# Patient Record
Sex: MALE | Race: WHITE | NOT HISPANIC OR LATINO | Employment: FULL TIME | URBAN - NONMETROPOLITAN AREA
[De-identification: names, ages, dates, MRNs, and addresses within clinical notes are randomized per-mention and may not be internally consistent; named-entity substitution may affect disease eponyms.]

---

## 2023-04-26 ENCOUNTER — APPOINTMENT (EMERGENCY)
Dept: CT IMAGING | Facility: HOSPITAL | Age: 53
End: 2023-04-26

## 2023-04-26 ENCOUNTER — APPOINTMENT (EMERGENCY)
Dept: RADIOLOGY | Facility: HOSPITAL | Age: 53
End: 2023-04-26

## 2023-04-26 ENCOUNTER — HOSPITAL ENCOUNTER (INPATIENT)
Facility: HOSPITAL | Age: 53
LOS: 2 days | Discharge: HOME | End: 2023-04-28
Attending: EMERGENCY MEDICINE | Admitting: FAMILY MEDICINE

## 2023-04-26 DIAGNOSIS — I5A NONISCHEMIC NONTRAUMATIC MYOCARDIAL INJURY: ICD-10-CM

## 2023-04-26 DIAGNOSIS — I36.1 NONRHEUMATIC TRICUSPID VALVE REGURGITATION: ICD-10-CM

## 2023-04-26 DIAGNOSIS — I16.0 HYPERTENSIVE URGENCY: ICD-10-CM

## 2023-04-26 DIAGNOSIS — R77.8 ELEVATED TROPONIN: ICD-10-CM

## 2023-04-26 DIAGNOSIS — I16.1 HYPERTENSIVE EMERGENCY: Primary | ICD-10-CM

## 2023-04-26 DIAGNOSIS — R55 NEAR SYNCOPE: ICD-10-CM

## 2023-04-26 DIAGNOSIS — F19.10 SUBSTANCE ABUSE (HCC): ICD-10-CM

## 2023-04-26 LAB
2HR DELTA HS TROPONIN: -13 NG/L
4HR DELTA HS TROPONIN: -10 NG/L
ALBUMIN SERPL BCP-MCNC: 3.2 G/DL (ref 3.5–5)
ALP SERPL-CCNC: 62 U/L (ref 34–104)
ALT SERPL W P-5'-P-CCNC: 14 U/L (ref 7–52)
AMPHETAMINES SERPL QL SCN: POSITIVE
ANION GAP SERPL CALCULATED.3IONS-SCNC: 4 MMOL/L (ref 4–13)
APTT PPP: 27 SECONDS (ref 23–37)
AST SERPL W P-5'-P-CCNC: 9 U/L (ref 13–39)
BARBITURATES UR QL: NEGATIVE
BASOPHILS # BLD AUTO: 0.03 THOUSANDS/ΜL (ref 0–0.1)
BASOPHILS NFR BLD AUTO: 0 % (ref 0–1)
BENZODIAZ UR QL: NEGATIVE
BILIRUB SERPL-MCNC: 0.33 MG/DL (ref 0.2–1)
BILIRUB UR QL STRIP: NEGATIVE
BUN SERPL-MCNC: 41 MG/DL (ref 5–25)
CALCIUM ALBUM COR SERPL-MCNC: 8.6 MG/DL (ref 8.3–10.1)
CALCIUM SERPL-MCNC: 8 MG/DL (ref 8.4–10.2)
CARDIAC TROPONIN I PNL SERPL HS: 86 NG/L
CARDIAC TROPONIN I PNL SERPL HS: 89 NG/L
CARDIAC TROPONIN I PNL SERPL HS: 99 NG/L
CHLORIDE SERPL-SCNC: 106 MMOL/L (ref 96–108)
CLARITY UR: CLEAR
CO2 SERPL-SCNC: 26 MMOL/L (ref 21–32)
COCAINE UR QL: NEGATIVE
COLOR UR: YELLOW
CREAT SERPL-MCNC: 1.03 MG/DL (ref 0.6–1.3)
EOSINOPHIL # BLD AUTO: 0.08 THOUSAND/ΜL (ref 0–0.61)
EOSINOPHIL NFR BLD AUTO: 1 % (ref 0–6)
ERYTHROCYTE [DISTWIDTH] IN BLOOD BY AUTOMATED COUNT: 12.3 % (ref 11.6–15.1)
GFR SERPL CREATININE-BSD FRML MDRD: 81 ML/MIN/1.73SQ M
GLUCOSE SERPL-MCNC: 131 MG/DL (ref 65–140)
GLUCOSE UR STRIP-MCNC: NEGATIVE MG/DL
HCT VFR BLD AUTO: 28.2 % (ref 36.5–49.3)
HGB BLD-MCNC: 9.7 G/DL (ref 12–17)
HGB UR QL STRIP.AUTO: NEGATIVE
IMM GRANULOCYTES # BLD AUTO: 0.05 THOUSAND/UL (ref 0–0.2)
IMM GRANULOCYTES NFR BLD AUTO: 1 % (ref 0–2)
INR PPP: 1.04 (ref 0.84–1.19)
KETONES UR STRIP-MCNC: NEGATIVE MG/DL
LACTATE SERPL-SCNC: 1.5 MMOL/L (ref 0.5–2)
LEUKOCYTE ESTERASE UR QL STRIP: NEGATIVE
LYMPHOCYTES # BLD AUTO: 2.15 THOUSANDS/ΜL (ref 0.6–4.47)
LYMPHOCYTES NFR BLD AUTO: 26 % (ref 14–44)
MAGNESIUM SERPL-MCNC: 1.9 MG/DL (ref 1.9–2.7)
MCH RBC QN AUTO: 29.6 PG (ref 26.8–34.3)
MCHC RBC AUTO-ENTMCNC: 34.4 G/DL (ref 31.4–37.4)
MCV RBC AUTO: 86 FL (ref 82–98)
METHADONE UR QL: NEGATIVE
MONOCYTES # BLD AUTO: 0.49 THOUSAND/ΜL (ref 0.17–1.22)
MONOCYTES NFR BLD AUTO: 6 % (ref 4–12)
NEUTROPHILS # BLD AUTO: 5.64 THOUSANDS/ΜL (ref 1.85–7.62)
NEUTS SEG NFR BLD AUTO: 66 % (ref 43–75)
NITRITE UR QL STRIP: NEGATIVE
NRBC BLD AUTO-RTO: 0 /100 WBCS
OPIATES UR QL SCN: NEGATIVE
OXYCODONE+OXYMORPHONE UR QL SCN: NEGATIVE
PCP UR QL: NEGATIVE
PH UR STRIP.AUTO: 5.5 [PH]
PLATELET # BLD AUTO: 332 THOUSANDS/UL (ref 149–390)
PMV BLD AUTO: 9.2 FL (ref 8.9–12.7)
POTASSIUM SERPL-SCNC: 3.5 MMOL/L (ref 3.5–5.3)
PROT SERPL-MCNC: 5.7 G/DL (ref 6.4–8.4)
PROT UR STRIP-MCNC: NEGATIVE MG/DL
PROTHROMBIN TIME: 13.7 SECONDS (ref 11.6–14.5)
RBC # BLD AUTO: 3.28 MILLION/UL (ref 3.88–5.62)
SODIUM SERPL-SCNC: 136 MMOL/L (ref 135–147)
SP GR UR STRIP.AUTO: 1.01 (ref 1–1.03)
THC UR QL: POSITIVE
TSH SERPL DL<=0.05 MIU/L-ACNC: 3.87 UIU/ML (ref 0.45–4.5)
UROBILINOGEN UR QL STRIP.AUTO: 0.2 E.U./DL
WBC # BLD AUTO: 8.44 THOUSAND/UL (ref 4.31–10.16)

## 2023-04-26 RX ORDER — NITROGLYCERIN 0.4 MG/1
0.4 TABLET SUBLINGUAL
Status: DISCONTINUED | OUTPATIENT
Start: 2023-04-26 | End: 2023-04-28 | Stop reason: HOSPADM

## 2023-04-26 RX ORDER — DOCUSATE SODIUM 100 MG/1
100 CAPSULE, LIQUID FILLED ORAL 2 TIMES DAILY
Status: DISCONTINUED | OUTPATIENT
Start: 2023-04-26 | End: 2023-04-28 | Stop reason: HOSPADM

## 2023-04-26 RX ORDER — ACETAMINOPHEN 325 MG/1
650 TABLET ORAL EVERY 6 HOURS PRN
Status: DISCONTINUED | OUTPATIENT
Start: 2023-04-26 | End: 2023-04-28 | Stop reason: HOSPADM

## 2023-04-26 RX ORDER — NICOTINE 21 MG/24HR
1 PATCH, TRANSDERMAL 24 HOURS TRANSDERMAL DAILY
Status: DISCONTINUED | OUTPATIENT
Start: 2023-04-27 | End: 2023-04-28 | Stop reason: HOSPADM

## 2023-04-26 RX ORDER — METOPROLOL TARTRATE 5 MG/5ML
5 INJECTION INTRAVENOUS ONCE
Status: COMPLETED | OUTPATIENT
Start: 2023-04-26 | End: 2023-04-26

## 2023-04-26 RX ORDER — CARVEDILOL 6.25 MG/1
6.25 TABLET ORAL 2 TIMES DAILY WITH MEALS
Status: DISCONTINUED | OUTPATIENT
Start: 2023-04-26 | End: 2023-04-28 | Stop reason: HOSPADM

## 2023-04-26 RX ORDER — CLONAZEPAM 0.5 MG/1
0.5 TABLET ORAL ONCE
Status: COMPLETED | OUTPATIENT
Start: 2023-04-26 | End: 2023-04-26

## 2023-04-26 RX ORDER — ASPIRIN 81 MG/1
81 TABLET ORAL DAILY
Status: DISCONTINUED | OUTPATIENT
Start: 2023-04-26 | End: 2023-04-28 | Stop reason: HOSPADM

## 2023-04-26 RX ORDER — ONDANSETRON 2 MG/ML
4 INJECTION INTRAMUSCULAR; INTRAVENOUS EVERY 6 HOURS PRN
Status: DISCONTINUED | OUTPATIENT
Start: 2023-04-26 | End: 2023-04-28 | Stop reason: HOSPADM

## 2023-04-26 RX ORDER — HYDRALAZINE HYDROCHLORIDE 20 MG/ML
5 INJECTION INTRAMUSCULAR; INTRAVENOUS EVERY 6 HOURS PRN
Status: DISCONTINUED | OUTPATIENT
Start: 2023-04-26 | End: 2023-04-28 | Stop reason: HOSPADM

## 2023-04-26 RX ORDER — LORAZEPAM 2 MG/ML
1 INJECTION INTRAMUSCULAR ONCE
Status: DISCONTINUED | OUTPATIENT
Start: 2023-04-26 | End: 2023-04-26

## 2023-04-26 RX ADMIN — ASPIRIN 81 MG: 81 TABLET, COATED ORAL at 15:10

## 2023-04-26 RX ADMIN — METOROPROLOL TARTRATE 5 MG: 5 INJECTION, SOLUTION INTRAVENOUS at 12:15

## 2023-04-26 RX ADMIN — METOROPROLOL TARTRATE 5 MG: 5 INJECTION, SOLUTION INTRAVENOUS at 14:04

## 2023-04-26 RX ADMIN — SODIUM CHLORIDE 500 ML: 0.9 INJECTION, SOLUTION INTRAVENOUS at 12:13

## 2023-04-26 RX ADMIN — CLONAZEPAM 0.5 MG: 0.5 TABLET ORAL at 12:14

## 2023-04-26 RX ADMIN — DOCUSATE SODIUM 100 MG: 100 CAPSULE ORAL at 18:20

## 2023-04-26 RX ADMIN — CARVEDILOL 6.25 MG: 6.25 TABLET, FILM COATED ORAL at 18:20

## 2023-04-26 NOTE — H&P
[address]  H&P  Name: Brielle Mathis 45 y o  male I MRN: [id number]  Unit/Bed#: -01 I Date of Admission: 4/26/2023   Date of Service: 4/26/2023 I Hospital Day: 0      Assessment/Plan   Nonischemic nontraumatic myocardial injury  Assessment & Plan  Upon arrival, troponin was elevated, first troponin was 99, now trending down, second troponin came down to 86  EKG nonsignificant  Most likely secondary to hypertensive urgency  Monitor on telemetry  Trend troponin  Patient denies any chest pain  * Hypertensive urgency  Assessment & Plan  Patient does not follow-up with PCP  As per patient, he is supposed to take Cozaar, which he is not taking anymore  Resented with elevated blood pressure  Status post IV medication  We will start with Coreg p o , use hydralazine as needed  Check labs    Near syncope  Assessment & Plan  EKG nonsignificant  Troponin:99>86  Patient was found hypertensive urgency-could be the cause  Check orthostatic hypotension  Monitor on telemetry  CT head negative  Check TSH        VTE Pharmacologic Prophylaxis: VTE Score: 2 Low Risk (Score 0-2) - Encourage Ambulation  Code Status: Level 1 - Full Code as per patient  Discussion with family: Updated  (son) at bedside  Anticipated Length of Stay: Patient will be admitted on an inpatient basis with an anticipated length of stay of greater than 2 midnights secondary to To monitor above conditions  Total Time Spent on Date of Encounter in care of patient: 15 minutes This time was spent on one or more of the following: performing physical exam; counseling and coordination of care; obtaining or reviewing history; documenting in the medical record; reviewing/ordering tests, medications or procedures; communicating with other healthcare professionals and discussing with patient's family/caregivers      Chief Complaint: Dizziness, lightheaded    History of Present Illness:  Brielle Mathis is a 45 y o  male with a PMH of hypertension who presents with dizziness, lightheaded  Patient reports earlier this morning when he tried to get up from bathroom, he felt lightheaded and sat down on the tab and he started having sweating  Then the episode again happened which lasted 10 to 15 minutes  He denies any blurry vision, headache, tingling, numbness, weakness, any nausea, any vomiting  Did not happen this before  Supposed to take Cozaar, which he is not taking anymore     As per patient, he had dark black stool yesterday  Denies any epigastric pain, nausea, vomiting  Review of Systems:  Review of Systems   Constitutional: Positive for activity change and diaphoresis  Negative for appetite change, chills, fatigue, fever and unexpected weight change  HENT: Negative for congestion, dental problem, hearing loss, mouth sores, nosebleeds, postnasal drip, rhinorrhea, sinus pressure and sinus pain  Respiratory: Negative for apnea, cough, choking and chest tightness  Cardiovascular: Negative for chest pain and leg swelling  Gastrointestinal: Negative for abdominal distention, abdominal pain, anal bleeding, blood in stool and constipation  Genitourinary: Negative for difficulty urinating, dysuria and enuresis  Musculoskeletal: Negative for arthralgias, back pain and gait problem  Skin: Negative for color change, pallor and rash  Neurological: Positive for dizziness and light-headedness  Negative for seizures, syncope, facial asymmetry, speech difficulty, numbness and headaches  Hematological: Negative for adenopathy  Psychiatric/Behavioral: Negative for agitation, behavioral problems and confusion  All other systems reviewed and are negative  Past Medical and Surgical History:   Past Medical History:   Diagnosis Date   • Hypertension        History reviewed  No pertinent surgical history      Meds/Allergies:  Prior to Admission medications    Not on File     I have reviewed home medications with patient personally  Allergies: Not on File    Social History:  Marital Status: Single   Occupation: Employed  Patient Pre-hospital Living Situation: Home  Patient Pre-hospital Level of Mobility: walks  Patient Pre-hospital Diet Restrictions: No restrictions  Substance Use History:   Social History     Substance and Sexual Activity   Alcohol Use Not Currently     Social History     Tobacco Use   Smoking Status Every Day   • Types: Cigarettes   Smokeless Tobacco Never     Social History     Substance and Sexual Activity   Drug Use Yes   • Types: Marijuana       Family History:  Family history of cardiac/stroke    Physical Exam:     Vitals:   Blood Pressure: 141/100 (04/26/23 1730)  Pulse: 76 (04/26/23 1730)  Temperature: 97 9 °F (36 6 °C) (04/26/23 1730)  Temp Source: Temporal (04/26/23 1150)  Respirations: 17 (04/26/23 1730)  Weight - Scale: 80 7 kg (177 lb 14 6 oz) (04/26/23 1150)  SpO2: 95 % (04/26/23 1730)    Physical Exam  Vitals and nursing note reviewed  Constitutional:       Appearance: Normal appearance  He is obese  He is not ill-appearing or diaphoretic  HENT:      Head: Normocephalic and atraumatic  Nose: Nose normal  No congestion  Mouth/Throat:      Mouth: Mucous membranes are moist       Pharynx: Oropharynx is clear  Eyes:      General: No scleral icterus  Left eye: No discharge  Extraocular Movements: Extraocular movements intact  Conjunctiva/sclera: Conjunctivae normal       Pupils: Pupils are equal, round, and reactive to light  Cardiovascular:      Rate and Rhythm: Normal rate  Heart sounds: Normal heart sounds  No murmur heard  No friction rub  No gallop  Pulmonary:      Effort: Pulmonary effort is normal  No respiratory distress  Breath sounds: No stridor  No wheezing  Abdominal:      General: Abdomen is flat  Bowel sounds are normal  There is no distension  Palpations: There is no mass  Tenderness: There is no abdominal tenderness  Hernia: No hernia is present  Musculoskeletal:         General: No swelling, tenderness, deformity or signs of injury  Normal range of motion  Cervical back: Normal range of motion  Skin:     General: Skin is warm  Capillary Refill: Capillary refill takes less than 2 seconds  Coloration: Skin is not jaundiced or pale  Findings: No bruising or erythema  Neurological:      General: No focal deficit present  Mental Status: He is alert and oriented to person, place, and time  Cranial Nerves: No cranial nerve deficit  Sensory: No sensory deficit  Motor: No weakness  Coordination: Coordination normal          Additional Data:     Lab Results:  Results from last 7 days   Lab Units 04/26/23  1202   WBC Thousand/uL 8 44   HEMOGLOBIN g/dL 9 7*   HEMATOCRIT % 28 2*   PLATELETS Thousands/uL 332   NEUTROS PCT % 66   LYMPHS PCT % 26   MONOS PCT % 6   EOS PCT % 1     Results from last 7 days   Lab Units 04/26/23  1202   SODIUM mmol/L 136   POTASSIUM mmol/L 3 5   CHLORIDE mmol/L 106   CO2 mmol/L 26   BUN mg/dL 41*   CREATININE mg/dL 1 03   ANION GAP mmol/L 4   CALCIUM mg/dL 8 0*   ALBUMIN g/dL 3 2*   TOTAL BILIRUBIN mg/dL 0 33   ALK PHOS U/L 62   ALT U/L 14   AST U/L 9*   GLUCOSE RANDOM mg/dL 131     Results from last 7 days   Lab Units 04/26/23  1202   INR  1 04             Results from last 7 days   Lab Units 04/26/23  1202   LACTIC ACID mmol/L 1 5       Lines/Drains:  Invasive Devices     Peripheral Intravenous Line  Duration           Peripheral IV 04/26/23 Left Antecubital <1 day                    Imaging: Reviewed radiology reports from this admission including: CT head  CT head without contrast   Final Result by Verenice Lau DO (04/26 1250)      No acute intracranial abnormality  Suggestive evidence of early microangiopathy  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 sinuses with possible acute on chronic component              Workstation performed: [id number]         XR chest pa & lateral    (Results Pending)       EKG and Other Studies Reviewed on Admission:   · EKG: Normal sinus rhythm with nonspecific ST-T changes       ** Please Note: This note has been constructed using a voice recognition system   **

## 2023-04-26 NOTE — ASSESSMENT & PLAN NOTE
EKG nonsignificant  Troponin:99>86  Patient was found hypertensive urgency-could be the cause  Check orthostatic hypotension  Monitor on telemetry  CT head negative  Check TSH

## 2023-04-26 NOTE — ASSESSMENT & PLAN NOTE
Patient does not follow-up with PCP  As per patient, he is supposed to take Cozaar, which he is not taking anymore  Resented with elevated blood pressure  Status post IV medication  We will start with Coreg p o , use hydralazine as needed  Check labs

## 2023-04-26 NOTE — ASSESSMENT & PLAN NOTE
Upon arrival, troponin was elevated, first troponin was 99, now trending down, second troponin came down to 86  EKG nonsignificant  Most likely secondary to hypertensive urgency  Monitor on telemetry  Trend troponin  Patient denies any chest pain

## 2023-04-26 NOTE — ED PROVIDER NOTES
History  Chief Complaint   Patient presents with   • Syncope     Patient had a near syncopal episode while urinating this morning and fell into the bathtub  Patient denies hitting head or blood thinners  The patient is a 41-year-old male, presents emerged department today with a chief complaint of near syncope  Patient states that upon waking up today after using the bathroom he began to feel lightheaded  He states that he had a near syncopal episode which caused him to fall backwards into his tub  Denies any loss of consciousness or head injury, neck pain  Patient states he was able to get up and had some diaphoresis  Patient states that he then laid back down for a few hours and got up again around 10 AM   States that this reoccurred where he felt lightheaded and near syncopal but was sitting downstairs at the kitchen  Patient states that he did not have any full syncopal episode  Patient was brought in by family  States that he has not seen a primary care physician in 2 to 3 years was previously on hypertension medication  Does not smoke and denies any alcohol use  Denies any headache, nausea vomiting abdominal pain chest pain, vision, numbness or tingling, neck pain head trauma, back pain  Denied any palpitation or heart racing sensation       patient states that he still feels lightheaded when he goes from laying to sitting up or sitting to standing  Syncope  Episode history:  Multiple  Most recent episode:   Today  Timing:  Constant  Progression:  Unchanged  Chronicity:  New  Context: standing up    Witnessed: no    Relieved by:  Bed rest and lying down  Worsened by:  Posture  Ineffective treatments:  Lying down and certain positions  Associated symptoms: diaphoresis and dizziness    Associated symptoms: no anxiety, no chest pain, no confusion, no difficulty breathing, no fever, no focal sensory loss, no focal weakness, no headaches, no malaise/fatigue, no nausea, no palpitations, no recent fall, no recent injury, no recent surgery, no rectal bleeding, no seizures, no shortness of breath, no visual change, no vomiting and no weakness    Dizziness:     Severity:  Mild    Timing:  Intermittent    Progression:  Unchanged  Risk factors: no congenital heart disease, no coronary artery disease, no seizures and no vascular disease        None       Past Medical History:   Diagnosis Date   • Hypertension        History reviewed  No pertinent surgical history  History reviewed  No pertinent family history  I have reviewed and agree with the history as documented  E-Cigarette/Vaping   • E-Cigarette Use Never User      E-Cigarette/Vaping Substances     Social History     Tobacco Use   • Smoking status: Every Day     Types: Cigarettes   • Smokeless tobacco: Never   Vaping Use   • Vaping Use: Never used   Substance Use Topics   • Alcohol use: Not Currently   • Drug use: Yes     Types: Marijuana       Review of Systems   Constitutional: Positive for diaphoresis  Negative for fever and malaise/fatigue  Respiratory: Negative for shortness of breath  Cardiovascular: Positive for syncope  Negative for chest pain and palpitations  Gastrointestinal: Negative for nausea and vomiting  Neurological: Positive for dizziness  Negative for focal weakness, seizures, weakness and headaches  Psychiatric/Behavioral: Negative for confusion  All other systems reviewed and are negative  Physical Exam  Physical Exam  Vitals and nursing note reviewed  Constitutional:       General: He is not in acute distress  Appearance: He is well-developed  HENT:      Head: Normocephalic and atraumatic  Eyes:      Extraocular Movements: Extraocular movements intact  Conjunctiva/sclera: Conjunctivae normal       Pupils: Pupils are equal, round, and reactive to light  Cardiovascular:      Rate and Rhythm: Regular rhythm  Tachycardia present  Heart sounds: No murmur heard    Pulmonary:      Effort: Pulmonary effort is normal  No respiratory distress  Breath sounds: Normal breath sounds  Abdominal:      Palpations: Abdomen is soft  Tenderness: There is no abdominal tenderness  Musculoskeletal:         General: No swelling  Cervical back: Normal range of motion and neck supple  Right lower leg: No edema  Left lower leg: No edema  Skin:     General: Skin is warm and dry  Capillary Refill: Capillary refill takes less than 2 seconds  Neurological:      General: No focal deficit present  Mental Status: He is alert and oriented to person, place, and time  Motor: No weakness  Gait: Gait normal    Psychiatric:         Mood and Affect: Mood normal          Vital Signs  ED Triage Vitals [04/26/23 1150]   Temperature Pulse Respirations Blood Pressure SpO2   97 6 °F (36 4 °C) (!) 116 18 (!) 200/120 100 %      Temp Source Heart Rate Source Patient Position - Orthostatic VS BP Location FiO2 (%)   Temporal Monitor Lying Right arm --      Pain Score       --           Vitals:    04/26/23 1342 04/26/23 1344 04/26/23 1345 04/26/23 1346   BP: (!) 177/106 170/92 (!) 177/106 167/83   Pulse: 79 75 75 85   Patient Position - Orthostatic VS: Lying - Orthostatic VS Sitting - Orthostatic VS  Standing - Orthostatic VS         Visual Acuity      ED Medications  Medications   metoprolol (LOPRESSOR) injection 5 mg (has no administration in time range)   LORazepam (ATIVAN) injection 1 mg (has no administration in time range)   clonazePAM (KlonoPIN) tablet 0 5 mg (0 5 mg Oral Given 4/26/23 1214)   metoprolol (LOPRESSOR) injection 5 mg (5 mg Intravenous Given 4/26/23 1215)   sodium chloride 0 9 % bolus 500 mL (0 mL Intravenous Stopped 4/26/23 1345)       Diagnostic Studies  Results Reviewed     Procedure Component Value Units Date/Time    HS Troponin I 2hr [[id number]] Collected: 04/26/23 1338    Lab Status:  In process Specimen: Blood from Arm, Left Updated: 04/26/23 1340    HS Troponin I 4hr [[id number]]     Lab Status: No result Specimen: Blood     HS Troponin 0hr (reflex protocol) [[id number]]  (Abnormal) Collected: 04/26/23 1202    Lab Status: Final result Specimen: Blood from Arm, Left Updated: 04/26/23 1230     hs TnI 0hr 99 ng/L     Protime-INR [[id number]]  (Normal) Collected: 04/26/23 1202    Lab Status: Final result Specimen: Blood from Arm, Left Updated: 04/26/23 1229     Protime 13 7 seconds      INR 1 04    APTT [[id number]]  (Normal) Collected: 04/26/23 1202    Lab Status: Final result Specimen: Blood from Arm, Left Updated: 04/26/23 1229     PTT 27 seconds     Comprehensive metabolic panel [[id number]]  (Abnormal) Collected: 04/26/23 1202    Lab Status: Final result Specimen: Blood from Arm, Left Updated: 04/26/23 1223     Sodium 136 mmol/L      Potassium 3 5 mmol/L      Chloride 106 mmol/L      CO2 26 mmol/L      ANION GAP 4 mmol/L      BUN 41 mg/dL      Creatinine 1 03 mg/dL      Glucose 131 mg/dL      Calcium 8 0 mg/dL      Corrected Calcium 8 6 mg/dL      AST 9 U/L      ALT 14 U/L      Alkaline Phosphatase 62 U/L      Total Protein 5 7 g/dL      Albumin 3 2 g/dL      Total Bilirubin 0 33 mg/dL      eGFR 81 ml/min/1 73sq m     Narrative:      Sharyn guidelines for Chronic Kidney Disease (CKD):   •  Stage 1 with normal or high GFR (GFR > 90 mL/min/1 73 square meters)  •  Stage 2 Mild CKD (GFR = 60-89 mL/min/1 73 square meters)  •  Stage 3A Moderate CKD (GFR = 45-59 mL/min/1 73 square meters)  •  Stage 3B Moderate CKD (GFR = 30-44 mL/min/1 73 square meters)  •  Stage 4 Severe CKD (GFR = 15-29 mL/min/1 73 square meters)  •  Stage 5 End Stage CKD (GFR <15 mL/min/1 73 square meters)  Note: GFR calculation is accurate only with a steady state creatinine    Magnesium [[id number]]  (Normal) Collected: 04/26/23 1202    Lab Status: Final result Specimen: Blood from Arm, Left Updated: 04/26/23 1223     Magnesium 1 9 mg/dL     Lactic acid, plasma (w/reflex if result > 2 0) [[id number]]  (Normal) Collected: 04/26/23 1202    Lab Status: Final result Specimen: Blood from Arm, Left Updated: 04/26/23 1222     LACTIC ACID 1 5 mmol/L     Narrative:      Result may be elevated if tourniquet was used during collection  CBC and differential [[id number]]  (Abnormal) Collected: 04/26/23 1202    Lab Status: Final result Specimen: Blood from Arm, Left Updated: 04/26/23 1207     WBC 8 44 Thousand/uL      RBC 3 28 Million/uL      Hemoglobin 9 7 g/dL      Hematocrit 28 2 %      MCV 86 fL      MCH 29 6 pg      MCHC 34 4 g/dL      RDW 12 3 %      MPV 9 2 fL      Platelets 514 Thousands/uL      nRBC 0 /100 WBCs      Neutrophils Relative 66 %      Immat GRANS % 1 %      Lymphocytes Relative 26 %      Monocytes Relative 6 %      Eosinophils Relative 1 %      Basophils Relative 0 %      Neutrophils Absolute 5 64 Thousands/µL      Immature Grans Absolute 0 05 Thousand/uL      Lymphocytes Absolute 2 15 Thousands/µL      Monocytes Absolute 0 49 Thousand/µL      Eosinophils Absolute 0 08 Thousand/µL      Basophils Absolute 0 03 Thousands/µL     UA w Reflex to Microscopic w Reflex to Culture [[id number]]     Lab Status: No result Specimen: Urine     Rapid drug screen, urine [[id number]]     Lab Status: No result Specimen: Urine                  CT head without contrast   Final Result by Daily Garcia DO (04/26 1250)      No acute intracranial abnormality  Suggestive evidence of early microangiopathy  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 sinuses with possible acute on chronic component              Workstation performed: [id number]         XR chest pa & lateral    (Results Pending)              Procedures  ECG 12 Lead Documentation Only    Date/Time: 4/26/2023 12:23 PM  Performed by: Jena Wagoner PA-C  Authorized by: Jena Wagoner PA-C     Indications / Diagnosis:  Dizziness  ECG reviewed by me, the ED Provider: yes    Patient location:  ED  Previous ECG: Previous ECG:  Unavailable  Interpretation:     Interpretation: abnormal    Rate:     ECG rate:  100    ECG rate assessment: tachycardic    Rhythm:     Rhythm: sinus tachycardia    ST segments:     ST segments:  Non-specific  T waves:     T waves: non-specific               ED Course  ED Course as of 04/26/23 1356   Wed Apr 26, 2023   1208 Hemoglobin(!): 9 7   1231 hs TnI 0hr(!): 99                                             Medical Decision Making  vThe patient is a 58-year-old male, presents emerged department today with a chief complaint of near syncope  Patient states that upon waking up today after using the bathroom he began to feel lightheaded  He states that he had a near syncopal episode which caused him to fall backwards into his tub  Denies any loss of consciousness or head injury, neck pain  Patient states he was able to get up and had some diaphoresis  Patient states that he then laid back down for a few hours and got up again around 10 AM   States that this reoccurred where he felt lightheaded and near syncopal but was sitting downstairs at the kitchen  Patient states that he did not have any full syncopal episode  Patient was brought in by family  States that he has not seen a primary care physician in 2 to 3 years was previously on hypertension medication  Does not smoke and denies any alcohol use  Denies any headache, nausea vomiting abdominal pain chest pain, vision, numbness or tingling, neck pain head trauma, back pain  Denied any palpitation or heart racing sensation       patient states that he still feels lightheaded when he goes from laying to sitting up or sitting to standing     patient's vitals upon arrival patient was tachycardic and profoundly hypertensive  Patient was given several doses of Lopressor and 1 dose of clonidine at bedside  Patient did express anxiety about staying in the hospital overnight so ordered 1 mg of Ativan    EKG did not illustrate any acute ischemic findings  Initial troponin on lab work was 99  Other lab work was relatively unremarkable, hemoglobin of 9 7 but no recent blood work to compare  X-ray of the chest was unremarkable  CT head/brain imaging did not illustrate any acute findings  Patient's blood pressure was monitored in the emergency department  Patient's blood pressure did seem to intermittently spike which required more antihypertensive medication  Patient felt well in the emergency department  No PCP to follow-up with at home or as an outpatient  Patient was discussed with the hospitalist service for admission due to hypertensive emergency and near syncopal event  Amount and/or Complexity of Data Reviewed  Labs: ordered  Decision-making details documented in ED Course  Radiology: ordered  Decision-making details documented in ED Course  ECG/medicine tests: ordered  Decision-making details documented in ED Course  Risk  Prescription drug management  Decision regarding hospitalization            Disposition  Final diagnoses:   Near syncope   Elevated troponin   Hypertensive emergency     Time reflects when diagnosis was documented in both MDM as applicable and the Disposition within this note     Time User Action Codes Description Comment    4/26/2023  1:26 PM Samy Martyr Add [R55] Near syncope     4/26/2023  1:26 PM Samy Martyr Add [R77 8] Elevated troponin     4/26/2023  1:26 PM Samy Martyr Add [I16 1] Hypertensive emergency     4/26/2023  1:35 PM Tab, [address] [R55] Near syncope     4/26/2023  1:35 PM Tab, [address] [I16 1] Hypertensive emergency       ED Disposition     ED Disposition   Admit    Condition   Stable    Date/Time   Wed Apr 26, 2023  1:35 PM    Comment   Case was discussed with emerson  and the patient's admission status was agreed to be Admission Status: inpatient status to the service of Dr Tonya Gomez             Follow-up Information    None         Patient's Medications    No medications on file       No discharge procedures on file      PDMP Review     None          ED Provider  Electronically Signed by           Jarrod Richardson PA-C  04/26/23 7474

## 2023-04-27 ENCOUNTER — APPOINTMENT (INPATIENT)
Dept: NON INVASIVE DIAGNOSTICS | Facility: HOSPITAL | Age: 53
End: 2023-04-27

## 2023-04-27 LAB
2HR DELTA HS TROPONIN: 2 NG/L
4HR DELTA HS TROPONIN: 2 NG/L
ALBUMIN SERPL BCP-MCNC: 3.1 G/DL (ref 3.5–5)
ALP SERPL-CCNC: 53 U/L (ref 34–104)
ALT SERPL W P-5'-P-CCNC: 18 U/L (ref 7–52)
ANION GAP SERPL CALCULATED.3IONS-SCNC: 5 MMOL/L (ref 4–13)
AORTIC ROOT: 3.8 CM
AORTIC VALVE MEAN VELOCITY: 9.9 M/S
APICAL FOUR CHAMBER EJECTION FRACTION: 47 %
ASCENDING AORTA: 3.4 CM
AST SERPL W P-5'-P-CCNC: 15 U/L (ref 13–39)
AV AREA BY CONTINUOUS VTI: 3.1 CM2
AV AREA PEAK VELOCITY: 2.9 CM2
AV LVOT MEAN GRADIENT: 3 MMHG
AV LVOT PEAK GRADIENT: 8 MMHG
AV MEAN GRADIENT: 5 MMHG
AV PEAK GRADIENT: 12 MMHG
AV VALVE AREA: 3.13 CM2
AV VELOCITY RATIO: 0.82
BASOPHILS # BLD AUTO: 0.03 THOUSANDS/ΜL (ref 0–0.1)
BASOPHILS NFR BLD AUTO: 0 % (ref 0–1)
BILIRUB SERPL-MCNC: 0.34 MG/DL (ref 0.2–1)
BUN SERPL-MCNC: 34 MG/DL (ref 5–25)
CALCIUM ALBUM COR SERPL-MCNC: 8.6 MG/DL (ref 8.3–10.1)
CALCIUM SERPL-MCNC: 7.9 MG/DL (ref 8.4–10.2)
CARDIAC TROPONIN I PNL SERPL HS: 35 NG/L
CARDIAC TROPONIN I PNL SERPL HS: 37 NG/L
CARDIAC TROPONIN I PNL SERPL HS: 37 NG/L
CHLORIDE SERPL-SCNC: 108 MMOL/L (ref 96–108)
CHOLEST SERPL-MCNC: 98 MG/DL
CO2 SERPL-SCNC: 23 MMOL/L (ref 21–32)
CREAT SERPL-MCNC: 0.98 MG/DL (ref 0.6–1.3)
DOP CALC AO PEAK VEL: 1.75 M/S
DOP CALC AO VTI: 26.48 CM
DOP CALC LVOT AREA: 3.46 CM2
DOP CALC LVOT DIAMETER: 2.1 CM
DOP CALC LVOT PEAK VEL VTI: 23.96 CM
DOP CALC LVOT PEAK VEL: 1.44 M/S
DOP CALC LVOT STROKE VOLUME: 82.95
E WAVE DECELERATION TIME: 163 MS
EOSINOPHIL # BLD AUTO: 0.18 THOUSAND/ΜL (ref 0–0.61)
EOSINOPHIL NFR BLD AUTO: 2 % (ref 0–6)
ERYTHROCYTE [DISTWIDTH] IN BLOOD BY AUTOMATED COUNT: 12.3 % (ref 11.6–15.1)
FRACTIONAL SHORTENING: 25 (ref 28–44)
GFR SERPL CREATININE-BSD FRML MDRD: 87 ML/MIN/1.73SQ M
GLUCOSE SERPL-MCNC: 104 MG/DL (ref 65–140)
HCT VFR BLD AUTO: 24.4 % (ref 36.5–49.3)
HDLC SERPL-MCNC: 20 MG/DL
HGB BLD-MCNC: 8.4 G/DL (ref 12–17)
IMM GRANULOCYTES # BLD AUTO: 0.03 THOUSAND/UL (ref 0–0.2)
IMM GRANULOCYTES NFR BLD AUTO: 0 % (ref 0–2)
INTERVENTRICULAR SEPTUM IN DIASTOLE (PARASTERNAL SHORT AXIS VIEW): 1.1 CM
INTERVENTRICULAR SEPTUM: 1.1 CM (ref 0.6–1.1)
LAAS-AP2: 18.8 CM2
LAAS-AP4: 19.4 CM2
LDLC SERPL CALC-MCNC: 46 MG/DL (ref 0–100)
LEFT ATRIUM SIZE: 4.2 CM
LEFT INTERNAL DIMENSION IN SYSTOLE: 3.6 CM (ref 2.1–4)
LEFT VENTRICLE DIASTOLIC VOLUME (MOD BIPLANE): 104 ML
LEFT VENTRICLE SYSTOLIC VOLUME (MOD BIPLANE): 51 ML
LEFT VENTRICULAR INTERNAL DIMENSION IN DIASTOLE: 4.8 CM (ref 3.5–6)
LEFT VENTRICULAR POSTERIOR WALL IN END DIASTOLE: 1.3 CM
LEFT VENTRICULAR STROKE VOLUME: 52 ML
LV EF: 51 %
LVSV (TEICH): 52 ML
LYMPHOCYTES # BLD AUTO: 2.32 THOUSANDS/ΜL (ref 0.6–4.47)
LYMPHOCYTES NFR BLD AUTO: 30 % (ref 14–44)
MCH RBC QN AUTO: 29.8 PG (ref 26.8–34.3)
MCHC RBC AUTO-ENTMCNC: 34.4 G/DL (ref 31.4–37.4)
MCV RBC AUTO: 87 FL (ref 82–98)
MONOCYTES # BLD AUTO: 0.44 THOUSAND/ΜL (ref 0.17–1.22)
MONOCYTES NFR BLD AUTO: 6 % (ref 4–12)
MV E'TISSUE VEL-LAT: 7 CM/S
MV E'TISSUE VEL-SEP: 5 CM/S
MV PEAK A VEL: 1.01 M/S
MV PEAK E VEL: 51 CM/S
MV STENOSIS PRESSURE HALF TIME: 47 MS
MV VALVE AREA P 1/2 METHOD: 4.68
NEUTROPHILS # BLD AUTO: 4.76 THOUSANDS/ΜL (ref 1.85–7.62)
NEUTS SEG NFR BLD AUTO: 62 % (ref 43–75)
NONHDLC SERPL-MCNC: 78 MG/DL
NRBC BLD AUTO-RTO: 0 /100 WBCS
PLATELET # BLD AUTO: 287 THOUSANDS/UL (ref 149–390)
PMV BLD AUTO: 9.4 FL (ref 8.9–12.7)
POTASSIUM SERPL-SCNC: 4.1 MMOL/L (ref 3.5–5.3)
PROT SERPL-MCNC: 5.4 G/DL (ref 6.4–8.4)
PV PEAK GRADIENT: 6 MMHG
RA PRESSURE ESTIMATED: 3 MMHG
RBC # BLD AUTO: 2.82 MILLION/UL (ref 3.88–5.62)
RIGHT ATRIUM AREA SYSTOLE A4C: 11 CM2
RIGHT VENTRICLE ID DIMENSION: 2.7 CM
SL CV LEFT ATRIUM LENGTH A2C: 5.4 CM
SL CV LV EF: 55
SL CV PED ECHO LEFT VENTRICLE DIASTOLIC VOLUME (MOD BIPLANE) 2D: 106 ML
SL CV PED ECHO LEFT VENTRICLE SYSTOLIC VOLUME (MOD BIPLANE) 2D: 54 ML
SODIUM SERPL-SCNC: 136 MMOL/L (ref 135–147)
TRICUSPID ANNULAR PLANE SYSTOLIC EXCURSION: 1.9 CM
TRIGL SERPL-MCNC: 162 MG/DL
WBC # BLD AUTO: 7.76 THOUSAND/UL (ref 4.31–10.16)

## 2023-04-27 RX ADMIN — NICOTINE 1 PATCH: 21 PATCH, EXTENDED RELEASE TRANSDERMAL at 07:55

## 2023-04-27 RX ADMIN — DOCUSATE SODIUM 100 MG: 100 CAPSULE ORAL at 17:06

## 2023-04-27 RX ADMIN — CARVEDILOL 6.25 MG: 6.25 TABLET, FILM COATED ORAL at 17:06

## 2023-04-27 RX ADMIN — ASPIRIN 81 MG: 81 TABLET, COATED ORAL at 07:56

## 2023-04-27 RX ADMIN — CARVEDILOL 6.25 MG: 6.25 TABLET, FILM COATED ORAL at 07:56

## 2023-04-27 RX ADMIN — DOCUSATE SODIUM 100 MG: 100 CAPSULE ORAL at 07:55

## 2023-04-27 NOTE — PLAN OF CARE
Problem: PAIN - ADULT  Goal: Verbalizes/displays adequate comfort level or baseline comfort level  Description: Interventions:  - Encourage patient to monitor pain and request assistance  - Assess pain using appropriate pain scale  - Administer analgesics based on type and severity of pain and evaluate response  - Implement non-pharmacological measures as appropriate and evaluate response  - Consider cultural and social influences on pain and pain management  - Notify physician/advanced practitioner if interventions unsuccessful or patient reports new pain  Outcome: Progressing     Problem: SAFETY ADULT  Goal: Patient will remain free of falls  Description: INTERVENTIONS:  - Educate patient/family on patient safety including physical limitations  - Instruct patient to call for assistance with activity   - Consult OT/PT to assist with strengthening/mobility   - Keep Call bell within reach  - Keep bed low and locked with side rails adjusted as appropriate  - Keep care items and personal belongings within reach  - Initiate and maintain comfort rounds  - Make Fall Risk Sign visible to staff  - Apply yellow socks and bracelet for high fall risk patients  - Consider moving patient to room near nurses station  Outcome: Progressing  Goal: Maintain or return to baseline ADL function  Description: INTERVENTIONS:  -  Assess patient's ability to carry out ADLs; assess patient's baseline for ADL function and identify physical deficits which impact ability to perform ADLs (bathing, care of mouth/teeth, toileting, grooming, dressing, etc )  - Assess/evaluate cause of self-care deficits   - Assess range of motion  - Assess patient's mobility; develop plan if impaired  - Assess patient's need for assistive devices and provide as appropriate  - Encourage maximum independence but intervene and supervise when necessary  - Involve family in performance of ADLs  - Assess for home care needs following discharge   - Consider OT consult to assist with ADL evaluation and planning for discharge  - Provide patient education as appropriate  Outcome: Progressing  Goal: Maintains/Returns to pre admission functional level  Description: INTERVENTIONS:  - Perform BMAT or MOVE assessment daily    - Set and communicate daily mobility goal to care team and patient/family/caregiver     - Collaborate with rehabilitation services on mobility goals if consulted  - Out of bed for toileting  - Record patient progress and toleration of activity level   Outcome: Progressing

## 2023-04-27 NOTE — CASE MANAGEMENT
Case Management Assessment & Discharge Planning Note    Patient name Job Perfect  Location [phone] MRN [id number]  : 1970 Date 2023       Current Admission Date: 2023  Current Admission Diagnosis:Hypertensive urgency   Patient Active Problem List    Diagnosis Date Noted   • Near syncope 2023   • Hypertensive urgency 2023   • Nonischemic nontraumatic myocardial injury 2023      LOS (days): 1  Geometric Mean LOS (GMLOS) (days): 2 10  Days to GMLOS:1     OBJECTIVE:    Risk of Unplanned Readmission Score: 9 91         Current admission status: Inpatient       Preferred Pharmacy:   [address]  [address] [place] [address]  Phone: [phone] Fax: [phone]    Primary Care Provider: No primary care provider on file  Primary Insurance: [facility]  Secondary Insurance:     ASSESSMENT:  Active Health Care Proxies    There are no active Health Care Proxies on file  Patient Information  Admitted from[de-identified] Home  Mental Status: Alert  During Assessment patient was accompanied by: Not accompanied during assessment  Assessment information provided by[de-identified] Patient  Primary Caregiver: Self  Support Systems: Spouse/significant other  [place] of Residence: [facility] entry access options   Select all that apply : Stairs  Number of steps to enter home : 6  Type of Current Residence: [address] [place] home  Upon entering residence, is there a bedroom on the main floor (no further steps)?: No  A bedroom is located on the following floor levels of residence (select all that apply):: 2nd Floor  Upon entering residence, is there a bathroom on the main floor (no further steps)?: No  Indicate which floors of current residence have a bathroom (select all the apply):: 2nd Floor  Number of steps to 2nd floor from main floor: One Flight  In the last 12 months, was there a time when you were not able to pay the mortgage or rent on time?: No  In the last 12 months, how many places have you lived?: 1  In the last 12 months, was there a time when you did not have a steady place to sleep or slept in a shelter (including now)?: No  Living Arrangements: Lives w/ Spouse/significant other    Activities of Daily Living Prior to Admission  Functional Status: Independent  Completes ADLs independently?: Yes  Ambulates independently?: Yes  Does patient use assisted devices?: No  Does patient currently own DME?: No  Does patient have a history of Outpatient Therapy (PT/OT)?: No  Does the patient have a history of Short-Term Rehab?: No  Does patient have a history of HHC?: No  Does patient currently have [employer]?: No         Patient Information Continued  Income Source: Employed  Does patient have prescription coverage?: Yes  Within the past 12 months, you worried that your food would run out before you got the money to buy more : Never true  Within the past 12 months, the food you bought just didn't last and you didn't have money to get more : Never true  Food insecurity resource given?: No  Does patient receive dialysis treatments?: No  Does patient have a history of substance abuse?: No  Does patient have a history of Mental Health Diagnosis?: No         Means of Transportation  Means of Transport to Appts[de-identified] Drives Self  In the past 12 months, has lack of transportation kept you from medical appointments or from getting medications?: No  In the past 12 months, has lack of transportation kept you from meetings, work, or from getting things needed for daily living?: No        DISCHARGE DETAILS:    Discharge planning discussed with[de-identified] patient  Freedom of Choice: Yes     CM contacted family/caregiver?: No- see comments (declined)             Contacts  Patient Contacts: Blanca Chand  Relationship to Patient[de-identified] Friend       Pt is from home, lives with SO   Pt IPTA, pt works full time at [employer]      Pt does not currently have a PCP, "CM offering to assist pt in obtaining a PCP for pt, pt declines, sts \"im going to go to the same dr as my fiance, in [place]  \"  CM educating pt on the importance of following through on getting a PCP, pt verbalizes understanding  CM educating pt on the importance of taking medications as prescribed, pt verbalizes understanding                                                                                        "

## 2023-04-27 NOTE — UTILIZATION REVIEW
Initial Clinical Review    Admission: Date/Time/Statement:   Admission Orders (From admission, onward)     Ordered        04/26/23 1336  INPATIENT ADMISSION  Once                      Orders Placed This Encounter   Procedures   • INPATIENT ADMISSION     Standing Status:   Standing     Number of Occurrences:   1     Order Specific Question:   Level of Care     Answer:   Med Surg [16]     Order Specific Question:   Estimated length of stay     Answer:   More than 2 Midnights     Order Specific Question:   Certification     Answer:   I certify that inpatient services are medically necessary for this patient for a duration of greater than two midnights  See H&P and MD Progress Notes for additional information about the patient's course of treatment  ED Arrival Information     Expected   -    Arrival   4/26/2023 11:41    Acuity   Emergent            Means of arrival   Walk-In    Escorted by   Self    Service   Hospitalist    Admission type   Emergency            Arrival complaint   syncopy episode LOC/dizzy           Chief Complaint   Patient presents with   • Syncope     Patient had a near syncopal episode while urinating this morning and fell into the bathtub  Patient denies hitting head or blood thinners  Initial Presentation: 45 y o  male to ED via walk in from home  Present with a PMHX of hypertension who presents with dizziness, lightheaded  near syncopal episode which caused him to fall backwards into his tub  he was able to get up and had some diaphoresis  States that this reoccurred where he felt lightheaded and near syncopal but was sitting downstairs at the kitchen  As per patient, he had dark black stool yesterday  Admitted to M/S with DX: Hypertensive urgency  on exam : Tachy HR 1116;  Tachypnea; HTN -200/102-120; Upon arrival, troponin was elevated, first troponin was 99, now trending down, second troponin came down to 86; H/H 9 7/ 28 2; BUN 41; Albumin 3 2  Given in ED lopressor iv x2; IVF bolus 500; asa  PLAN: Cardiopulmonary monitoring; I/O; Daily wts; monitor labs; orthostatic BP's; trend BP's; trend trops       Date: 4/27/23    Day 2  Feeling better but anxious; Also UDS positive for metamphetamine; Murmur on auscultation; Now pressures are improve; Will get Echo   BUN 34; Ca 7 9; TP 5 4;  Albumin 3 1; H/H 8 4 / 24 4  Plan: Cardiopulmonary monitoring; I/O; Daily wts; monitor labs; trend BP's; trend trops ; f/u echo      ED Triage Vitals   Temperature Pulse Respirations Blood Pressure SpO2   04/26/23 1150 04/26/23 1150 04/26/23 1150 04/26/23 1150 04/26/23 1150   97 6 °F (36 4 °C) (!) 116 18 (!) 200/120 100 %      Temp Source Heart Rate Source Patient Position - Orthostatic VS BP Location FiO2 (%)   04/26/23 1150 04/26/23 1150 04/26/23 1150 04/26/23 1150 --   Temporal Monitor Lying Right arm       Pain Score       04/26/23 1929       No Pain          Wt Readings from Last 1 Encounters:   04/27/23 79 7 kg (175 lb 12 8 oz)     Additional Vital Signs:   Date/Time Temp Pulse Resp BP MAP (mmHg) SpO2 O2 Device Patient Position - Orthostatic VS   04/27/23 11:00:36 98 4 °F (36 9 °C) 78 18 138/89 101 98 % -- --   04/27/23 07:12:56 98 1 °F (36 7 °C) 78 20 141/85 99 96 % -- --   04/27/23 03:14:19 98 2 °F (36 8 °C) 79 18 145/94 106 94 % -- Lying   04/26/23 22:37:20 98 6 °F (37 °C) 74 16 135/90 100 96 % -- --   04/26/23 2021 -- -- -- -- -- 96 % None (Room air) --   04/26/23 1900 98 4 °F (36 9 °C) 75 15 132/90 104 96 % -- --   04/26/23 17:30:38 97 9 °F (36 6 °C) 76 17 141/100 114 95 % -- --   04/26/23 1715 -- 79 18 137/98 -- 100 % None (Room air) Sitting   04/26/23 1630 -- 69 -- 147/95 115 94 % -- --   04/26/23 1500 -- 64 -- 182/119 Abnormal  145 97 % -- --   04/26/23 1430 -- 64 -- 137/91 109 97 % -- --   04/26/23 1346 -- 85 20 167/83 -- -- -- Standing - Orthostatic VS   04/26/23 1345 -- 75 24 Abnormal  177/106 Abnormal  135 100 % -- --   04/26/23 1344 -- 75 16 170/92 -- -- -- Sitting - Orthostatic VS   04/26/23 1342 -- 79 16 177/106 Abnormal  -- -- -- Lying - Orthostatic VS   04/26/23 1330 -- 69 -- 158/100 123 100 % -- --   04/26/23 1320 -- 75 -- 164/102 Abnormal  -- -- -- --   04/26/23 1240 -- 72 -- 141/94 -- -- -- --   04/26/23 1150 97 6 °F (36 4 °C) 116 Abnormal  18 200/120 Abnormal  -- 100 % None (Room air) Lying           EKG: Previous ECG:     Previous ECG:  Unavailable   Interpretation:     Interpretation: abnormal     Rate:     ECG rate:  100     ECG rate assessment: tachycardic     Rhythm:     Rhythm: sinus tachycardia     ST segments:     ST segments:  Non-specific   T waves:     T waves: non-specific      Pertinent Labs/Diagnostic Test Results:   XR chest pa & lateral   Final Result by Bethel Sutherland MD (04/27 0521)      No acute cardiopulmonary disease  Workstation performed: [id number]         CT head without contrast   Final Result by Audi Puente DO (04/26 1250)      No acute intracranial abnormality  Suggestive evidence of early microangiopathy  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 sinuses with possible acute on chronic component              Workstation performed: [id number]               Results from last 7 days   Lab Units 04/27/23  0444 04/26/23  1202   WBC Thousand/uL 7 76 8 44   HEMOGLOBIN g/dL 8 4* 9 7*   HEMATOCRIT % 24 4* 28 2*   PLATELETS Thousands/uL 287 332   NEUTROS ABS Thousands/µL 4 76 5 64         Results from last 7 days   Lab Units 04/27/23  0444 04/26/23  1202   SODIUM mmol/L 136 136   POTASSIUM mmol/L 4 1 3 5   CHLORIDE mmol/L 108 106   CO2 mmol/L 23 26   ANION GAP mmol/L 5 4   BUN mg/dL 34* 41*   CREATININE mg/dL 0 98 1 03   EGFR ml/min/1 73sq m 87 81   CALCIUM mg/dL 7 9* 8 0*   MAGNESIUM mg/dL  --  1 9     Results from last 7 days   Lab Units 04/27/23  0444 04/26/23  1202   AST U/L 15 9*   ALT U/L 18 14   ALK PHOS U/L 53 62   TOTAL PROTEIN g/dL 5 4* 5 7*   ALBUMIN g/dL 3 1* 3 2*   TOTAL BILIRUBIN mg/dL 0 34 0 33         Results from last 7 days   Lab Units 04/27/23  0444 04/26/23  1202   GLUCOSE RANDOM mg/dL 104 131       Results from last 7 days   Lab Units 04/26/23  1645 04/26/23  1338 04/26/23  1202   HS TNI 0HR ng/L  --   --  99*   HS TNI 2HR ng/L  --  86*  --    HSTNI D2 ng/L  --  -13  --    HS TNI 4HR ng/L 89*  --   --    HSTNI D4 ng/L -10  --   --          Results from last 7 days   Lab Units 04/26/23  1202   PROTIME seconds 13 7   INR  1 04   PTT seconds 27     Results from last 7 days   Lab Units 04/26/23  1202   TSH 3RD GENERATON uIU/mL 3 868         Results from last 7 days   Lab Units 04/26/23  1202   LACTIC ACID mmol/L 1 5       Results from last 7 days   Lab Units 04/26/23  1652   CLARITY UA  Clear   COLOR UA  Yellow   SPEC GRAV UA  1 010   PH UA  5 5   GLUCOSE UA mg/dl Negative   KETONES UA mg/dl Negative   BLOOD UA  Negative   PROTEIN UA mg/dl Negative   NITRITE UA  Negative   BILIRUBIN UA  Negative   UROBILINOGEN UA E U /dl 0 2   LEUKOCYTES UA  Negative       Results from last 7 days   Lab Units 04/26/23  1651   AMPH/METH  Positive*   BARBITURATE UR  Negative   BENZODIAZEPINE UR  Negative   COCAINE UR  Negative   METHADONE URINE  Negative   OPIATE UR  Negative   PCP UR  Negative   THC UR  Positive*       ED Treatment:   Medication Administration from 04/26/2023 1136 to 04/26/2023 1720       Date/Time Order Dose Route Action     04/26/2023 1214 EDT clonazePAM (KlonoPIN) tablet 0 5 mg 0 5 mg Oral Given     04/26/2023 1215 EDT metoprolol (LOPRESSOR) injection 5 mg 5 mg Intravenous Given     04/26/2023 1213 EDT sodium chloride 0 9 % bolus 500 mL 500 mL Intravenous New Bag     04/26/2023 1404 EDT metoprolol (LOPRESSOR) injection 5 mg 5 mg Intravenous Given     04/26/2023 1510 EDT aspirin (ECOTRIN LOW STRENGTH) EC tablet 81 mg 81 mg Oral Given          Present on Admission:  • Near syncope  • Hypertensive urgency  • Nonischemic nontraumatic myocardial injury      Admitting Diagnosis: Syncope [R55]  Elevated troponin [R77 8]  Near syncope [R55]  Hypertensive emergency [I16 1]     Age/Sex: 45 y o  male     Admission Orders: SCDs; daily wts; I/O; orthostatic BP's; Cardiopulmonary monitoring; cardiac diet    Scheduled Medications:  aspirin, 81 mg, Oral, Daily  carvedilol, 6 25 mg, Oral, BID With Meals  docusate sodium, 100 mg, Oral, BID  nicotine, 1 patch, Transdermal, Daily      Continuous IV Infusions: none     PRN Meds:  acetaminophen, 650 mg, Oral, Q6H PRN  hydrALAZINE, 5 mg, Intravenous, Q6H PRN  nitroglycerin, 0 4 mg, Sublingual, Q5 Min PRN  ondansetron, 4 mg, Intravenous, Q6H PRN        IP CONSULT TO CASE MANAGEMENT    Network Utilization Review Department  ATTENTION: Please call with any questions or concerns to [phone] and carefully listen to the prompts so that you are directed to the right person  All voicemails are confidential   Mookie Avery all requests for admission clinical reviews, approved or denied determinations and any other requests to dedicated fax number below belonging to the campus where the patient is receiving treatment   List of dedicated fax numbers for the Facilities:  [address] DENIALS (Administrative/Medical Necessity) [phone]   [address] (Maternity/NICU/Pediatrics) [phone]   3 Sara Baez [phone]   Jacqueline Herrera 77 [phone]   [address] [place] [address] Alexandro Evans 28 [phone]   1558 [facility] Katlyn Chan [facility] 134 815 [facility] [phone]

## 2023-04-27 NOTE — PROGRESS NOTES
[address]  Progress Note  Name: Khadijah Nava  MRN: [id number]  Unit/Bed#: -01 I Date of Admission: 4/26/2023   Date of Service: 4/27/2023 I Hospital Day: 1    Assessment/Plan   Nonischemic nontraumatic myocardial injury  Assessment & Plan  Likely 2/2 HTN urgency   No chest pain and no further near syncopal events      Near syncope  Assessment & Plan  EKG nonsignificant  Troponin:99>86  Patient was found hypertensive urgency-could be the cause  Also UDS positive for metamphetamine   Murmur on auscultation     * Hypertensive urgency  Assessment & Plan  Patient does not follow-up with PCP  As per patient, he is supposed to take Cozaar, which he is not taking anymore  Now pressures are improve   Will get Echo   Likely DC tomorrow if this is normal- will see if cardiology can see him in the office               VTE Pharmacologic Prophylaxis: VTE Score: 2 Moderate Risk (Score 3-4) - Pharmacological DVT Prophylaxis Ordered: heparin  Patient Centered Rounds: I performed bedside rounds with nursing staff today  Discussions with Specialists or Other Care Team Provider: will discuss with cardiology and get Echo     Education and Discussions with Family / Patient: called Pierce Milton -   Total Time Spent on Date of Encounter in care of patient: 30 min  This time was spent on one or more of the following: performing physical exam; counseling and coordination of care; obtaining or reviewing history; documenting in the medical record; reviewing/ordering tests, medications or procedures; communicating with other healthcare professionals and discussing with patient's family/caregivers  Current Length of Stay: 1 day(s)  Current Patient Status: Inpatient   Certification Statement: The patient will continue to require additional inpatient hospital stay due to needs Echo and further monitoring     Discharge Plan: likely tomorrow     Code Status: Level 1 - Full Code    Subjective:   Patient seen "and examined  Feeling \"much better\"  \"anxious\"    Objective:     Vitals:   Temp (24hrs), Av 3 °F (36 8 °C), Min:97 9 °F (36 6 °C), Max:98 6 °F (37 °C)    Temp:  [97 9 °F (36 6 °C)-98 6 °F (37 °C)] 98 4 °F (36 9 °C)  HR:  [64-85] 78  Resp:  [15-24] 18  BP: (132-182)/() 138/89  SpO2:  [94 %-100 %] 98 %  There is no height or weight on file to calculate BMI  Input and Output Summary (last 24 hours): Intake/Output Summary (Last 24 hours) at 2023 1248  Last data filed at 2023 1345  Gross per 24 hour   Intake 500 ml   Output --   Net 500 ml       Physical Exam:   Physical Exam  Constitutional:       General: He is not in acute distress  Appearance: He is not ill-appearing, toxic-appearing or diaphoretic  Eyes:      Pupils: Pupils are equal, round, and reactive to light  Cardiovascular:      Rate and Rhythm: Normal rate  Heart sounds: Murmur heard  Pulmonary:      Effort: No respiratory distress  Breath sounds: No stridor  No wheezing, rhonchi or rales  Chest:      Chest wall: No tenderness  Abdominal:      General: Abdomen is flat  There is no distension  Palpations: There is no mass  Tenderness: There is no abdominal tenderness  There is no right CVA tenderness, left CVA tenderness, guarding or rebound  Hernia: No hernia is present  Skin:     Capillary Refill: Capillary refill takes less than 2 seconds  Coloration: Skin is not jaundiced or pale  Findings: No erythema, lesion or rash  Neurological:      Mental Status: He is alert  Cranial Nerves: No cranial nerve deficit  Sensory: No sensory deficit  Motor: No weakness        Gait: Gait normal    Psychiatric:         Mood and Affect: Mood normal           Additional Data:     Labs:  Results from last 7 days   Lab Units 23  0444   WBC Thousand/uL 7 76   HEMOGLOBIN g/dL 8 4*   HEMATOCRIT % 24 4*   PLATELETS Thousands/uL 287   NEUTROS PCT % 62   LYMPHS PCT % 30   MONOS PCT % " 6   EOS PCT % 2     Results from last 7 days   Lab Units 04/27/23  0444   SODIUM mmol/L 136   POTASSIUM mmol/L 4 1   CHLORIDE mmol/L 108   CO2 mmol/L 23   BUN mg/dL 34*   CREATININE mg/dL 0 98   ANION GAP mmol/L 5   CALCIUM mg/dL 7 9*   ALBUMIN g/dL 3 1*   TOTAL BILIRUBIN mg/dL 0 34   ALK PHOS U/L 53   ALT U/L 18   AST U/L 15   GLUCOSE RANDOM mg/dL 104     Results from last 7 days   Lab Units 04/26/23  1202   INR  1 04             Results from last 7 days   Lab Units 04/26/23  1202   LACTIC ACID mmol/L 1 5       Lines/Drains:  Invasive Devices     Peripheral Intravenous Line  Duration           Peripheral IV 04/26/23 Left Antecubital 1 day                  Telemetry:  Telemetry Orders (From admission, onward)             24 Hour Telemetry Monitoring  Continuous x 24 Hours (Telem)        Expiring   References:    Telemetry Guidelines   Question:  Reason for 24 Hour Telemetry  Answer:  Syncope suspected to be cardiac in origin                 Telemetry Reviewed: Normal Sinus Rhythm  Indication for Continued Telemetry Use: Acute MI/Unstable Angina/Rule out ACS             Imaging: No pertinent imaging reviewed  Recent Cultures (last 7 days):         Last 24 Hours Medication List:   Current Facility-Administered Medications   Medication Dose Route Frequency Provider Last Rate   • acetaminophen  650 mg Oral Q6H PRN Yareli Torres MD     • aspirin  81 mg Oral Daily Yareli Torres MD     • carvedilol  6 25 mg Oral BID With Meals Gerald Vega MD     • docusate sodium  100 mg Oral BID Yareli Torres MD     • hydrALAZINE  5 mg Intravenous Q6H PRN Yareli Torres MD     • nicotine  1 patch Transdermal Daily Yareli Torres MD     • nitroglycerin  0 4 mg Sublingual Q5 Min PRN Yareli Torres MD     • ondansetron  4 mg Intravenous Q6H PRN Yareli Torres MD          Today, Patient Was Seen By: Que Saxena MD    **Please Note: This note may have been constructed using a voice recognition system  **

## 2023-04-27 NOTE — ASSESSMENT & PLAN NOTE
Patient does not follow-up with PCP  As per patient, he is supposed to take Cozaar, which he is not taking anymore  Now pressures are improve   Will get Echo   Likely DC tomorrow if this is normal- will see if cardiology can see him in the office

## 2023-04-27 NOTE — ASSESSMENT & PLAN NOTE
EKG nonsignificant  Troponin:99>86  Patient was found hypertensive urgency-could be the cause  Also UDS positive for metamphetamine   Murmur on auscultation

## 2023-04-28 VITALS
RESPIRATION RATE: 18 BRPM | HEART RATE: 71 BPM | WEIGHT: 175.8 LBS | SYSTOLIC BLOOD PRESSURE: 158 MMHG | OXYGEN SATURATION: 99 % | BODY MASS INDEX: 27.59 KG/M2 | TEMPERATURE: 98.1 F | HEIGHT: 67 IN | DIASTOLIC BLOOD PRESSURE: 75 MMHG

## 2023-04-28 PROBLEM — I36.1 NONRHEUMATIC TRICUSPID VALVE REGURGITATION: Status: ACTIVE | Noted: 2023-04-28

## 2023-04-28 PROBLEM — F19.10 SUBSTANCE ABUSE (HCC): Status: ACTIVE | Noted: 2023-04-28

## 2023-04-28 LAB
ALBUMIN SERPL BCP-MCNC: 3.1 G/DL (ref 3.5–5)
ALP SERPL-CCNC: 53 U/L (ref 34–104)
ALT SERPL W P-5'-P-CCNC: 20 U/L (ref 7–52)
ANION GAP SERPL CALCULATED.3IONS-SCNC: 3 MMOL/L (ref 4–13)
AST SERPL W P-5'-P-CCNC: 14 U/L (ref 13–39)
BASOPHILS # BLD AUTO: 0.01 THOUSANDS/ΜL (ref 0–0.1)
BASOPHILS NFR BLD AUTO: 0 % (ref 0–1)
BILIRUB SERPL-MCNC: 0.41 MG/DL (ref 0.2–1)
BUN SERPL-MCNC: 19 MG/DL (ref 5–25)
CALCIUM ALBUM COR SERPL-MCNC: 8.9 MG/DL (ref 8.3–10.1)
CALCIUM SERPL-MCNC: 8.2 MG/DL (ref 8.4–10.2)
CHLORIDE SERPL-SCNC: 108 MMOL/L (ref 96–108)
CO2 SERPL-SCNC: 26 MMOL/L (ref 21–32)
CREAT SERPL-MCNC: 0.9 MG/DL (ref 0.6–1.3)
EOSINOPHIL # BLD AUTO: 0.15 THOUSAND/ΜL (ref 0–0.61)
EOSINOPHIL NFR BLD AUTO: 2 % (ref 0–6)
ERYTHROCYTE [DISTWIDTH] IN BLOOD BY AUTOMATED COUNT: 12.4 % (ref 11.6–15.1)
GFR SERPL CREATININE-BSD FRML MDRD: 96 ML/MIN/1.73SQ M
GLUCOSE SERPL-MCNC: 106 MG/DL (ref 65–140)
HCT VFR BLD AUTO: 23 % (ref 36.5–49.3)
HGB BLD-MCNC: 8 G/DL (ref 12–17)
IMM GRANULOCYTES # BLD AUTO: 0.02 THOUSAND/UL (ref 0–0.2)
IMM GRANULOCYTES NFR BLD AUTO: 0 % (ref 0–2)
LYMPHOCYTES # BLD AUTO: 1.56 THOUSANDS/ΜL (ref 0.6–4.47)
LYMPHOCYTES NFR BLD AUTO: 25 % (ref 14–44)
MCH RBC QN AUTO: 30.2 PG (ref 26.8–34.3)
MCHC RBC AUTO-ENTMCNC: 34.8 G/DL (ref 31.4–37.4)
MCV RBC AUTO: 87 FL (ref 82–98)
MONOCYTES # BLD AUTO: 0.45 THOUSAND/ΜL (ref 0.17–1.22)
MONOCYTES NFR BLD AUTO: 7 % (ref 4–12)
NEUTROPHILS # BLD AUTO: 4.07 THOUSANDS/ΜL (ref 1.85–7.62)
NEUTS SEG NFR BLD AUTO: 66 % (ref 43–75)
NRBC BLD AUTO-RTO: 0 /100 WBCS
PLATELET # BLD AUTO: 295 THOUSANDS/UL (ref 149–390)
PMV BLD AUTO: 9.5 FL (ref 8.9–12.7)
POTASSIUM SERPL-SCNC: 3.9 MMOL/L (ref 3.5–5.3)
PROT SERPL-MCNC: 5.5 G/DL (ref 6.4–8.4)
RBC # BLD AUTO: 2.65 MILLION/UL (ref 3.88–5.62)
SODIUM SERPL-SCNC: 137 MMOL/L (ref 135–147)
WBC # BLD AUTO: 6.26 THOUSAND/UL (ref 4.31–10.16)

## 2023-04-28 RX ORDER — ASPIRIN 81 MG/1
81 TABLET ORAL DAILY
Qty: 30 TABLET | Refills: 0 | Status: SHIPPED | OUTPATIENT
Start: 2023-04-29

## 2023-04-28 RX ORDER — CARVEDILOL 6.25 MG/1
6.25 TABLET ORAL 2 TIMES DAILY WITH MEALS
Qty: 60 TABLET | Refills: 0 | Status: SHIPPED | OUTPATIENT
Start: 2023-04-28 | End: 2023-05-28

## 2023-04-28 RX ORDER — LOSARTAN POTASSIUM 25 MG/1
25 TABLET ORAL DAILY
Qty: 30 TABLET | Refills: 0 | Status: SHIPPED | OUTPATIENT
Start: 2023-04-29 | End: 2023-05-29

## 2023-04-28 RX ORDER — LOSARTAN POTASSIUM 25 MG/1
25 TABLET ORAL DAILY
Status: DISCONTINUED | OUTPATIENT
Start: 2023-04-28 | End: 2023-04-28 | Stop reason: HOSPADM

## 2023-04-28 RX ADMIN — ASPIRIN 81 MG: 81 TABLET, COATED ORAL at 09:27

## 2023-04-28 RX ADMIN — LOSARTAN POTASSIUM 25 MG: 25 TABLET, FILM COATED ORAL at 09:27

## 2023-04-28 RX ADMIN — CARVEDILOL 6.25 MG: 6.25 TABLET, FILM COATED ORAL at 06:30

## 2023-04-28 NOTE — NURSING NOTE
The patient packed his belongings  The AVS was reviewed with the patient by this RN  He was discharged to home in stable condition on room air

## 2023-04-28 NOTE — DISCHARGE SUMMARY
[address]  Discharge- Sabrina Colby 1970, 45 y o  male MRN: [id number]  Unit/Bed#: -01 Encounter: [id number]  Primary Care Provider: No primary care provider on file  Date and time admitted to hospital: 4/26/2023 11:47 AM    Nonischemic nontraumatic myocardial injury  Assessment & Plan  Likely 2/2 HTN urgency   No chest pain and no further near syncopal events  Echocardiogram shows mild concentric hypertrophy of left ventricle, ejection fraction 55%, dilated left atrium and aortic root dilatation/tricuspid regurgitation  * Hypertensive urgency  Assessment & Plan  Patient does not follow-up with PCP  As per patient, he is supposed to take Cozaar, which he is not taking anymore  Now pressures are improve   Echocardiogram done which shows mild concentric hypertrophy, ejection fraction 55%, tricuspid regurgitation (could be the effect of underlying substance use )  Blood pressure is improving, patient remained asymptomatic  Patient is started on home dose of losartan, continue Coreg, advised the patient to start aspirin and follow-up with cardiology outpatient basis  Verbalizes to understand and agrees  Substance abuse ([facility] [address] )  Assessment & Plan  Urine drug screen shows methamphetamine positive  Echocardiogram shows:•  Left Ventricle: Left ventricular cavity size is normal  Wall thickness is increased  There is mild concentric hypertrophy  The left ventricular ejection fraction is 55%  Systolic function is normal  Wall motion is normal  Diastolic function is mildly abnormal, consistent with grade I (abnormal) relaxation  Left atrial filling pressure is normal   •  Left Atrium: The atrium is mildly dilated  •  Aorta: The aortic root is mildly dilated  The ascending aorta is normal in size  The aortic root is 3 80 cm  The ascending aorta is 3 4 cm  •  Mitral Valve: Mitral valve structure is normal  There is trace regurgitation  There is no evidence of stenosis    • Tricuspid Valve: Tricuspid valve structure is normal  There is trace regurgitation  There is no evidence of stenosis  Right ventricular systolic pressure could not be assessed  Nonrheumatic tricuspid valve regurgitation  Assessment & Plan  Conservative management  Follow outpatient cardiology    Near syncope  Assessment & Plan  EKG nonsignificant  Troponin:99>86  Patient was found hypertensive urgency-could be the cause  Also UDS positive for metamphetamine-suspect affecting her since echocardiogram shows ejection fraction 55%, mild concentric hypertrophy of left ventricle, left atrium dilated, aortic root dilated and tricuspid regurgitation  Murmur on auscultation         Medical Problems     Resolved Problems  Date Reviewed: 4/27/2023   None       Discharging Physician / Practitioner: Stefanie Roper MD  PCP: No primary care provider on file  Admission Date:   Admission Orders (From admission, onward)     Ordered        04/26/23 [address] [place] [address]  Once                      Discharge Date: 04/28/23    Consultations During Hospital Stay:  · none    Procedures Performed:   XR chest pa & lateral   Final Result by Akira Zamora MD (04/27 0547)      No acute cardiopulmonary disease  Workstation performed: [id number]         CT head without contrast   Final Result by Kingston Dai DO (04/26 1250)      No acute intracranial abnormality  Suggestive evidence of early microangiopathy  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 sinuses with possible acute on chronic component  Workstation performed: [id number]         ·   · ECHO:  •  Left Ventricle: Left ventricular cavity size is normal  Wall thickness is increased  There is mild concentric hypertrophy  The left ventricular ejection fraction is 55%  Systolic function is normal  Wall motion is normal  Diastolic function is mildly abnormal, consistent with grade I (abnormal) relaxation    Left atrial filling pressure is normal   •  Left Atrium: The atrium is mildly dilated  •  Aorta: The aortic root is mildly dilated  The ascending aorta is normal in size  The aortic root is 3 80 cm  The ascending aorta is 3 4 cm  •  Mitral Valve: Mitral valve structure is normal  There is trace regurgitation  There is no evidence of stenosis  •  Tricuspid Valve: Tricuspid valve structure is normal  There is trace regurgitation  There is no evidence of stenosis  Right ventricular systolic pressure could not be assessed  Significant Findings / Test Results:   Lab Results   Component Value Date    WBC 6 26 04/28/2023    HGB 8 0 (L) 04/28/2023    HCT 23 0 (L) 04/28/2023    MCV 87 04/28/2023     04/28/2023   ·   Lab Results   Component Value Date    SODIUM 137 04/28/2023    K 3 9 04/28/2023     04/28/2023    CO2 26 04/28/2023    AGAP 3 (L) 04/28/2023    BUN 19 04/28/2023    CREATININE 0 90 04/28/2023    GLUC 106 04/28/2023    CALCIUM 8 2 (L) 04/28/2023    AST 14 04/28/2023    ALT 20 04/28/2023    ALKPHOS 53 04/28/2023    TP 5 5 (L) 04/28/2023    TBILI 0 41 04/28/2023    EGFR 96 04/28/2023   ·   Lab Results   Component Value Date    IRS5JDNYZSHR 3 868 04/26/2023   ·   ·     Incidental Findings:   · As mentioned above  · I reviewed the above mentioned incidental findings with the patient and/or family and they expressed understanding  Test Results Pending at Discharge (will require follow up): · None     Outpatient Tests Requested:  · None    Complications: None    Reason for Admission: Near syncope    Hospital Course:   Eric Varner is a 45 y o  male patient who originally presented to the hospital on 4/26/2023 due to near syncope, initial troponin was elevated secondary to hypertensive urgency  Patient supposed to take Cozaar which patient was not taking for long time, patient placed on Coreg and monitor  With the treatment, patient blood pressure improved, symptoms resolved    Telemetry "nonsignificant, initial EKG showing ST- T nonspecific changes  Remained asymptomatic  Urine drug screen shows methamphetamine  Echocardiogram shows ejection fraction 55%, mild left ventricular hypertrophy, tricuspid regurgitation, aortic root dilatation, left atrial dilatation  The results with the patient  Verbalized understanding agrees  Advised to follow-up with PCP referral provided, as well as cardiology, referral provided  Patient advised to continue Coreg, losartan, add low-dose aspirin  Risk, benefits, side effects and alternative discussed in details with patient  Verbalized understanding agrees  Please see above list of diagnoses and related plan for additional information  Condition at Discharge: stable    Discharge Day Visit / Exam:   Subjective: Seen and evaluated during the event  Resting comfortably  Denies any significant complaint  Vitals: Blood Pressure: 158/75 (04/28/23 0628)  Pulse: 71 (04/28/23 0628)  Temperature: 98 1 °F (36 7 °C) (04/28/23 0628)  Temp Source: Temporal (04/28/23 0446)  Respirations: 18 (04/28/23 0628)  Height: 5' 7\" (170 2 cm) (04/27/23 1413)  Weight - Scale: 79 7 kg (175 lb 12 8 oz) (04/28/23 0600)  SpO2: 99 % (04/28/23 0905)  Exam:   Physical Exam  Vitals and nursing note reviewed  Constitutional:       Appearance: Normal appearance  He is not ill-appearing or diaphoretic  HENT:      Mouth/Throat:      Mouth: Mucous membranes are moist       Pharynx: Oropharynx is clear  No oropharyngeal exudate  Eyes:      General: No scleral icterus  Left eye: No discharge  Extraocular Movements: Extraocular movements intact  Conjunctiva/sclera: Conjunctivae normal       Pupils: Pupils are equal, round, and reactive to light  Neck:      Vascular: No carotid bruit  Cardiovascular:      Rate and Rhythm: Normal rate  Heart sounds: Murmur heard  No friction rub  No gallop     Pulmonary:      Effort: Pulmonary effort is normal  No " respiratory distress  Breath sounds: No stridor  No wheezing or rhonchi  Abdominal:      General: Abdomen is flat  Bowel sounds are normal  There is no distension  Palpations: There is no mass  Tenderness: There is no abdominal tenderness  Hernia: No hernia is present  Musculoskeletal:         General: Normal range of motion  Cervical back: Normal range of motion  Right lower leg: No edema  Left lower leg: No edema  Lymphadenopathy:      Cervical: No cervical adenopathy  Skin:     General: Skin is warm  Capillary Refill: Capillary refill takes less than 2 seconds  Coloration: Skin is not jaundiced or pale  Findings: No bruising  Neurological:      General: No focal deficit present  Mental Status: He is alert and oriented to person, place, and time  Cranial Nerves: No cranial nerve deficit  Sensory: No sensory deficit  Motor: No weakness  Coordination: Coordination normal          Discussion with Family: Updated  (son) via phone  Discharge instructions/Information to patient and family:   See after visit summary for information provided to patient and family  Provisions for Follow-Up Care:  See after visit summary for information related to follow-up care and any pertinent home health orders  Disposition:   Home    Planned Readmission: If condition get worse     Discharge Statement:   Greater than 50% of the total time was spent examining patient, answering all patient questions, arranging and discussing plan of care with patient as well as directly providing post-discharge instructions  Additional time then spent on discharge activities  Discharge Medications:  See after visit summary for reconciled discharge medications provided to patient and/or family        **Please Note: This note may have been constructed using a voice recognition system**

## 2023-04-28 NOTE — ASSESSMENT & PLAN NOTE
Urine drug screen shows methamphetamine positive  Echocardiogram shows:•  Left Ventricle: Left ventricular cavity size is normal  Wall thickness is increased  There is mild concentric hypertrophy  The left ventricular ejection fraction is 55%  Systolic function is normal  Wall motion is normal  Diastolic function is mildly abnormal, consistent with grade I (abnormal) relaxation  Left atrial filling pressure is normal   •  Left Atrium: The atrium is mildly dilated  •  Aorta: The aortic root is mildly dilated  The ascending aorta is normal in size  The aortic root is 3 80 cm  The ascending aorta is 3 4 cm  •  Mitral Valve: Mitral valve structure is normal  There is trace regurgitation  There is no evidence of stenosis  •  Tricuspid Valve: Tricuspid valve structure is normal  There is trace regurgitation  There is no evidence of stenosis  Right ventricular systolic pressure could not be assessed

## 2023-04-28 NOTE — ASSESSMENT & PLAN NOTE
EKG nonsignificant  Troponin:99>86  Patient was found hypertensive urgency-could be the cause  Also UDS positive for metamphetamine-suspect affecting her since echocardiogram shows ejection fraction 55%, mild concentric hypertrophy of left ventricle, left atrium dilated, aortic root dilated and tricuspid regurgitation    Murmur on auscultation

## 2023-04-28 NOTE — UTILIZATION REVIEW
NOTIFICATION OF INPATIENT ADMISSION   AUTHORIZATION REQUEST   SERVICING FACILITY:   [address] [place], [place] [address]omar Graves [address] [place] [address], [address]  Tax ID: [id number]  NPI: [id number] ATTENDING PROVIDER:  Attending Name and NPI#: Nancy Lacy Md [[id number]]  Address: Venkata Graves [address] [place] [address], [facility] Theresa Baez  Phone: [phone]   ADMISSION INFORMATION:  Place of Service: [address] [place] [address] Code: 21  Inpatient Admission Date/Time: 4/26/23  1:36 PM  Discharge Date/Time: 4/28/2023 10:19 AM  Admitting Diagnosis Code/Description:  Syncope [R55]  Elevated troponin [R77 8]  Near syncope [R55]  Hypertensive emergency [I16 1]     UTILIZATION REVIEW CONTACT:  Sharon Workman Utilization   Network Utilization Review Department  Phone: [phone]  Fax [phone]  Email: [email or web address]  Contact for approvals/pending authorizations, clinical reviews, and discharge  PHYSICIAN ADVISORY SERVICES:  Medical [facility]cessity [facility] Review  Phone: [phone]  Fax: [phone]  Email: [email or web address]

## 2023-04-28 NOTE — PLAN OF CARE
Problem: PAIN - ADULT  Goal: Verbalizes/displays adequate comfort level or baseline comfort level  Description: Interventions:  - Encourage patient to monitor pain and request assistance  - Assess pain using appropriate pain scale  - Administer analgesics based on type and severity of pain and evaluate response  - Implement non-pharmacological measures as appropriate and evaluate response  - Consider cultural and social influences on pain and pain management  - Notify physician/advanced practitioner if interventions unsuccessful or patient reports new pain  4/28/2023 1027 by Teena Venegas RN  Outcome: Adequate for Discharge  4/28/2023 0909 by Teena Venegas RN  Outcome: Progressing     Problem: SAFETY ADULT  Goal: Patient will remain free of falls  Description: INTERVENTIONS:  - Educate patient/family on patient safety including physical limitations  - Instruct patient to call for assistance with activity   - Consult OT/PT to assist with strengthening/mobility   - Keep Call bell within reach  - Keep bed low and locked with side rails adjusted as appropriate  - Keep care items and personal belongings within reach  - Initiate and maintain comfort rounds  - Make Fall Risk Sign visible to staff  - Apply yellow socks and bracelet for high fall risk patients  - Consider moving patient to room near nurses station  4/28/2023 1027 by Teena Venegas RN  Outcome: Adequate for Discharge  4/28/2023 0909 by Teena Venegas RN  Outcome: Progressing  Goal: Maintain or return to baseline ADL function  Description: INTERVENTIONS:  -  Assess patient's ability to carry out ADLs; assess patient's baseline for ADL function and identify physical deficits which impact ability to perform ADLs (bathing, care of mouth/teeth, toileting, grooming, dressing, etc )  - Assess/evaluate cause of self-care deficits   - Assess range of motion  - Assess patient's mobility; develop plan if impaired  - Assess patient's need for assistive devices and provide as appropriate  - Encourage maximum independence but intervene and supervise when necessary  - Involve family in performance of ADLs  - Assess for home care needs following discharge   - Consider OT consult to assist with ADL evaluation and planning for discharge  - Provide patient education as appropriate  4/28/2023 1027 by Jett Irene RN  Outcome: Adequate for Discharge  4/28/2023 0909 by Jett Irene RN  Outcome: Progressing  Goal: Maintains/Returns to pre admission functional level  Description: INTERVENTIONS:  - Perform BMAT or MOVE assessment daily    - Set and communicate daily mobility goal to care team and patient/family/caregiver     - Collaborate with rehabilitation services on mobility goals if consulted  - Out of bed for toileting  - Record patient progress and toleration of activity level   4/28/2023 1027 by Jett Irene RN  Outcome: Adequate for Discharge  4/28/2023 0909 by Jett Irene RN  Outcome: Progressing     Problem: SAFETY ADULT  Goal: Maintain or return to baseline ADL function  Description: INTERVENTIONS:  -  Assess patient's ability to carry out ADLs; assess patient's baseline for ADL function and identify physical deficits which impact ability to perform ADLs (bathing, care of mouth/teeth, toileting, grooming, dressing, etc )  - Assess/evaluate cause of self-care deficits   - Assess range of motion  - Assess patient's mobility; develop plan if impaired  - Assess patient's need for assistive devices and provide as appropriate  - Encourage maximum independence but intervene and supervise when necessary  - Involve family in performance of ADLs  - Assess for home care needs following discharge   - Consider OT consult to assist with ADL evaluation and planning for discharge  - Provide patient education as appropriate  4/28/2023 1027 by Jett Irene RN  Outcome: Adequate for Discharge  4/28/2023 0909 by Jett Irene RN  Outcome: Progressing     Problem: SAFETY ADULT  Goal: Maintains/Returns to pre admission functional level  Description: INTERVENTIONS:  - Perform BMAT or MOVE assessment daily    - Set and communicate daily mobility goal to care team and patient/family/caregiver     - Collaborate with rehabilitation services on mobility goals if consulted  - Out of bed for toileting  - Record patient progress and toleration of activity level   4/28/2023 1027 by Brenda Urban RN  Outcome: Adequate for Discharge  4/28/2023 0909 by Brenda Urban RN  Outcome: Progressing     Problem: DISCHARGE PLANNING  Goal: Discharge to home or other facility with appropriate resources  Description: INTERVENTIONS:  - Identify barriers to discharge w/patient and caregiver  - Arrange for needed discharge resources and transportation as appropriate  - Identify discharge learning needs (meds, wound care, etc )  - Arrange for interpretive services to assist at discharge as needed  - Refer to Case Management Department for coordinating discharge planning if the patient needs post-hospital services based on physician/advanced practitioner order or complex needs related to functional status, cognitive ability, or social support system  4/28/2023 1027 by Brenda Urban RN  Outcome: Adequate for Discharge  4/28/2023 0909 by Brenda Urban RN  Outcome: Progressing     Problem: CARDIOVASCULAR - ADULT  Goal: Maintains optimal cardiac output and hemodynamic stability  Description: INTERVENTIONS:  - Monitor I/O, vital signs and rhythm  - Monitor for S/S and trends of decreased cardiac output  - Administer and titrate ordered vasoactive medications to optimize hemodynamic stability  - Assess quality of pulses, skin color and temperature  - Assess for signs of decreased coronary artery perfusion  - Instruct patient to report change in severity of symptoms  4/28/2023 1027 by Brenda Urban RN  Outcome: Adequate for Discharge  4/28/2023 0909 by Brenda Urban RN  Outcome: Progressing     Problem: MUSCULOSKELETAL - ADULT  Goal: Maintain or return mobility to safest level of function  Description: INTERVENTIONS:  - Assess patient's ability to carry out ADLs; assess patient's baseline for ADL function and identify physical deficits which impact ability to perform ADLs (bathing, care of mouth/teeth, toileting, grooming, dressing, etc )  - Assess/evaluate cause of self-care deficits   - Assess range of motion  - Assess patient's mobility  - Assess patient's need for assistive devices and provide as appropriate  - Encourage maximum independence but intervene and supervise when necessary  - Involve family in performance of ADLs  - Assess for home care needs following discharge   - Consider OT consult to assist with ADL evaluation and planning for discharge  - Provide patient education as appropriate  4/28/2023 1027 by Jett Irene RN  Outcome: Adequate for Discharge  4/28/2023 0909 by Jett Irene, RN  Outcome: Progressing

## 2023-04-28 NOTE — ASSESSMENT & PLAN NOTE
Likely 2/2 HTN urgency   No chest pain and no further near syncopal events  Echocardiogram shows mild concentric hypertrophy of left ventricle, ejection fraction 55%, dilated left atrium and aortic root dilatation/tricuspid regurgitation

## 2023-04-28 NOTE — ASSESSMENT & PLAN NOTE
Patient does not follow-up with PCP  As per patient, he is supposed to take Cozaar, which he is not taking anymore  Now pressures are improve   Echocardiogram done which shows mild concentric hypertrophy, ejection fraction 55%, tricuspid regurgitation (could be the effect of underlying substance use )  Blood pressure is improving, patient remained asymptomatic  Patient is started on home dose of losartan, continue Coreg, advised the patient to start aspirin and follow-up with cardiology outpatient basis  Verbalizes to understand and agrees

## 2023-04-30 LAB
EST. AVERAGE GLUCOSE BLD GHB EST-MCNC: 114 MG/DL
HBA1C MFR BLD: 5.6 %

## 2023-05-01 NOTE — UTILIZATION REVIEW
NOTIFICATION OF ADMISSION DISCHARGE   This is a Notification of Discharge from [address] [place] [address]  Please be advised that this patient has been discharge from our facility  Below you will find the admission and discharge date and time including the patients disposition  UTILIZATION REVIEW CONTACT:  Ava Grullon  Utilization   Network Utilization Review Department  Phone: [phone] x carefully listen to the prompts  All voicemails are confidential   Email: [email or web address]     ADMISSION INFORMATION  PRESENTATION DATE: 4/26/2023 11:47 AM  OBERVATION ADMISSION DATE:   INPATIENT ADMISSION DATE: 4/26/23  1:36 PM   DISCHARGE DATE: 4/28/2023 10:19 AM   DISPOSITION:Home/Self Care    IMPORTANT INFORMATION:  Send all requests for admission clinical reviews, approved or denied determinations and any other requests to dedicated fax number below belonging to the campus where the patient is receiving treatment   List of dedicated fax numbers:  [address] DENIALS (Administrative/Medical Necessity) [phone]   [address] (Maternity/NICU/Pediatrics) [phone]   [facility] [phone]   [address] [place] [address] [phone]   [address] [place] [address] [phone]   2000 [facility] [address] [place] [address],[address]  [phone]   [facility]  [phone]   [address] [facility] [address] [facility]  [address] [facility] 1000 [address] [facility] [phone]

## 2023-05-04 LAB
ATRIAL RATE: 100 BPM
P AXIS: 66 DEGREES
PR INTERVAL: 152 MS
QRS AXIS: 57 DEGREES
QRSD INTERVAL: 106 MS
QT INTERVAL: 342 MS
QTC INTERVAL: 441 MS
T WAVE AXIS: 65 DEGREES
VENTRICULAR RATE: 100 BPM

## 2023-10-24 ENCOUNTER — APPOINTMENT (EMERGENCY)
Dept: RADIOLOGY | Facility: HOSPITAL | Age: 53
End: 2023-10-24
Payer: COMMERCIAL

## 2023-10-24 ENCOUNTER — HOSPITAL ENCOUNTER (EMERGENCY)
Facility: HOSPITAL | Age: 53
Discharge: HOME | End: 2023-10-24
Attending: EMERGENCY MEDICINE
Payer: COMMERCIAL

## 2023-10-24 VITALS
BODY MASS INDEX: 27.47 KG/M2 | RESPIRATION RATE: 18 BRPM | HEART RATE: 64 BPM | TEMPERATURE: 97 F | OXYGEN SATURATION: 98 % | WEIGHT: 175 LBS | SYSTOLIC BLOOD PRESSURE: 172 MMHG | HEIGHT: 67 IN | DIASTOLIC BLOOD PRESSURE: 108 MMHG

## 2023-10-24 DIAGNOSIS — R07.9 CHEST PAIN, UNSPECIFIED TYPE: Primary | ICD-10-CM

## 2023-10-24 DIAGNOSIS — I10 HYPERTENSION: ICD-10-CM

## 2023-10-24 LAB
ALBUMIN SERPL BCP-MCNC: 3.9 G/DL (ref 3.5–5)
ALP SERPL-CCNC: 80 U/L (ref 34–104)
ALT SERPL W P-5'-P-CCNC: 14 U/L (ref 7–52)
AMPHETAMINES SERPL QL SCN: NEGATIVE
ANION GAP SERPL CALCULATED.3IONS-SCNC: 4 MMOL/L
AST SERPL W P-5'-P-CCNC: 13 U/L (ref 13–39)
BARBITURATES UR QL: NEGATIVE
BASOPHILS # BLD AUTO: 0.03 THOUSANDS/ÂΜL (ref 0–0.1)
BASOPHILS NFR BLD AUTO: 1 % (ref 0–1)
BENZODIAZ UR QL: NEGATIVE
BILIRUB SERPL-MCNC: 0.42 MG/DL (ref 0.2–1)
BILIRUB UR QL STRIP: NEGATIVE
BUN SERPL-MCNC: 13 MG/DL (ref 5–25)
CALCIUM SERPL-MCNC: 9.3 MG/DL (ref 8.4–10.2)
CARDIAC TROPONIN I PNL SERPL HS: 7 NG/L
CHLORIDE SERPL-SCNC: 102 MMOL/L (ref 96–108)
CLARITY UR: CLEAR
CO2 SERPL-SCNC: 30 MMOL/L (ref 21–32)
COCAINE UR QL: NEGATIVE
COLOR UR: NORMAL
CREAT SERPL-MCNC: 0.98 MG/DL (ref 0.6–1.3)
EOSINOPHIL # BLD AUTO: 0.23 THOUSAND/ÂΜL (ref 0–0.61)
EOSINOPHIL NFR BLD AUTO: 4 % (ref 0–6)
ERYTHROCYTE [DISTWIDTH] IN BLOOD BY AUTOMATED COUNT: 18 % (ref 11.6–15.1)
GFR SERPL CREATININE-BSD FRML MDRD: 86 ML/MIN/1.73SQ M
GLUCOSE SERPL-MCNC: 101 MG/DL (ref 65–140)
GLUCOSE UR STRIP-MCNC: NEGATIVE MG/DL
HCT VFR BLD AUTO: 41.2 % (ref 36.5–49.3)
HGB BLD-MCNC: 12.9 G/DL (ref 12–17)
HGB UR QL STRIP.AUTO: NEGATIVE
IMM GRANULOCYTES # BLD AUTO: 0.02 THOUSAND/UL (ref 0–0.2)
IMM GRANULOCYTES NFR BLD AUTO: 0 % (ref 0–2)
KETONES UR STRIP-MCNC: NEGATIVE MG/DL
LEUKOCYTE ESTERASE UR QL STRIP: NEGATIVE
LYMPHOCYTES # BLD AUTO: 1.59 THOUSANDS/ÂΜL (ref 0.6–4.47)
LYMPHOCYTES NFR BLD AUTO: 30 % (ref 14–44)
MCH RBC QN AUTO: 24.5 PG (ref 26.8–34.3)
MCHC RBC AUTO-ENTMCNC: 31.3 G/DL (ref 31.4–37.4)
MCV RBC AUTO: 78 FL (ref 82–98)
METHADONE UR QL: NEGATIVE
MONOCYTES # BLD AUTO: 0.43 THOUSAND/ÂΜL (ref 0.17–1.22)
MONOCYTES NFR BLD AUTO: 8 % (ref 4–12)
NEUTROPHILS # BLD AUTO: 3 THOUSANDS/ÂΜL (ref 1.85–7.62)
NEUTS SEG NFR BLD AUTO: 57 % (ref 43–75)
NITRITE UR QL STRIP: NEGATIVE
NRBC BLD AUTO-RTO: 0 /100 WBCS
OPIATES UR QL SCN: NEGATIVE
OXYCODONE+OXYMORPHONE UR QL SCN: NEGATIVE
PCP UR QL: NEGATIVE
PH UR STRIP.AUTO: 7 [PH]
PLATELET # BLD AUTO: 317 THOUSANDS/UL (ref 149–390)
PMV BLD AUTO: 9.2 FL (ref 8.9–12.7)
POTASSIUM SERPL-SCNC: 3.9 MMOL/L (ref 3.5–5.3)
PROT SERPL-MCNC: 7 G/DL (ref 6.4–8.4)
PROT UR STRIP-MCNC: NEGATIVE MG/DL
RBC # BLD AUTO: 5.26 MILLION/UL (ref 3.88–5.62)
SODIUM SERPL-SCNC: 136 MMOL/L (ref 135–147)
SP GR UR STRIP.AUTO: 1.01 (ref 1–1.03)
THC UR QL: POSITIVE
UROBILINOGEN UR QL STRIP.AUTO: 0.2 E.U./DL
WBC # BLD AUTO: 5.3 THOUSAND/UL (ref 4.31–10.16)

## 2023-10-24 PROCEDURE — 81003 URINALYSIS AUTO W/O SCOPE: CPT | Performed by: EMERGENCY MEDICINE

## 2023-10-24 PROCEDURE — 96374 THER/PROPH/DIAG INJ IV PUSH: CPT

## 2023-10-24 PROCEDURE — 93005 ELECTROCARDIOGRAM TRACING: CPT

## 2023-10-24 PROCEDURE — 36415 COLL VENOUS BLD VENIPUNCTURE: CPT | Performed by: EMERGENCY MEDICINE

## 2023-10-24 PROCEDURE — 99285 EMERGENCY DEPT VISIT HI MDM: CPT

## 2023-10-24 PROCEDURE — 71046 X-RAY EXAM CHEST 2 VIEWS: CPT

## 2023-10-24 PROCEDURE — 85025 COMPLETE CBC W/AUTO DIFF WBC: CPT | Performed by: EMERGENCY MEDICINE

## 2023-10-24 PROCEDURE — 99285 EMERGENCY DEPT VISIT HI MDM: CPT | Performed by: EMERGENCY MEDICINE

## 2023-10-24 PROCEDURE — 80053 COMPREHEN METABOLIC PANEL: CPT | Performed by: EMERGENCY MEDICINE

## 2023-10-24 PROCEDURE — 80307 DRUG TEST PRSMV CHEM ANLYZR: CPT | Performed by: EMERGENCY MEDICINE

## 2023-10-24 PROCEDURE — 84484 ASSAY OF TROPONIN QUANT: CPT | Performed by: EMERGENCY MEDICINE

## 2023-10-24 RX ORDER — ASPIRIN 81 MG/1
162 TABLET, CHEWABLE ORAL ONCE
Status: COMPLETED | OUTPATIENT
Start: 2023-10-24 | End: 2023-10-24

## 2023-10-24 RX ORDER — LABETALOL HYDROCHLORIDE 5 MG/ML
20 INJECTION, SOLUTION INTRAVENOUS ONCE
Status: COMPLETED | OUTPATIENT
Start: 2023-10-24 | End: 2023-10-24

## 2023-10-24 RX ORDER — CLONIDINE HYDROCHLORIDE 0.1 MG/1
0.2 TABLET ORAL ONCE
Status: COMPLETED | OUTPATIENT
Start: 2023-10-24 | End: 2023-10-24

## 2023-10-24 RX ADMIN — Medication 20 MG: at 15:56

## 2023-10-24 RX ADMIN — ASPIRIN 81 MG 162 MG: 81 TABLET ORAL at 15:56

## 2023-10-24 RX ADMIN — CLONIDINE HYDROCHLORIDE 0.2 MG: 0.1 TABLET ORAL at 15:56

## 2023-10-24 NOTE — Clinical Note
Kristine Hernandez was seen and treated in our emergency department on 10/24/2023. Diagnosis:     Kait Camargo  may return to work on return date. He may return on this date: 10/26/2023         If you have any questions or concerns, please don't hesitate to call.       Olga Plasencia MD    ______________________________           _______________          _______________  Hospital Representative                              Date                                Time

## 2023-10-24 NOTE — ED PROVIDER NOTES
History  Chief Complaint   Patient presents with    Chest Pain     Pt reports chest pain started yesterday; also reports HTN w/ no relief from daily  meds       History provided by:  Medical records and patient  Chest Pain  Pain location:  R chest  Pain quality: aching and dull    Pain radiates to:  Does not radiate  Pain radiates to the back: no    Pain severity:  Mild  Onset quality:  Gradual  Duration:  7 hours  Timing:  Intermittent  Progression:  Waxing and waning (Only present when raising up his right arm)  Chronicity:  Recurrent  Context comment:  Patient states he developed right-sided chest discomfort with movement of his right arm, unclear if he lifts something heavy last night. Patient has a history of hypertension, his wife was checking his blood pressure was noted to be 621 systolic. Relieved by:  Nothing  Worsened by:  Nothing tried  Ineffective treatments: Cozaar, carvedilol, aspirin 81 mg. Associated symptoms: no abdominal pain, no back pain, no cough, no dizziness, no dysphagia, no fatigue, no fever, no headache, no nausea, no palpitations, no shortness of breath, not vomiting and no weakness    Risk factors comment:  History of substance abuse      Prior to Admission Medications   Prescriptions Last Dose Informant Patient Reported? Taking?   aspirin (ECOTRIN LOW STRENGTH) 81 mg EC tablet   No No   Sig: Take 1 tablet (81 mg total) by mouth daily Do not start before April 29, 2023. carvedilol (COREG) 6.25 mg tablet   No No   Sig: Take 1 tablet (6.25 mg total) by mouth 2 (two) times a day with meals   losartan (COZAAR) 25 mg tablet   No No   Sig: Take 1 tablet (25 mg total) by mouth daily Do not start before April 29, 2023. Facility-Administered Medications: None       Past Medical History:   Diagnosis Date    Hypertension        History reviewed. No pertinent surgical history. History reviewed. No pertinent family history.   I have reviewed and agree with the history as documented. E-Cigarette/Vaping    E-Cigarette Use Never User      E-Cigarette/Vaping Substances     Social History     Tobacco Use    Smoking status: Every Day     Types: Cigarettes    Smokeless tobacco: Never   Vaping Use    Vaping Use: Never used   Substance Use Topics    Alcohol use: Not Currently    Drug use: Yes     Types: Marijuana       Review of Systems   Constitutional:  Negative for appetite change, chills, fatigue and fever. HENT:  Negative for ear pain, rhinorrhea, sore throat and trouble swallowing. Eyes:  Negative for pain, discharge and visual disturbance. Respiratory:  Negative for cough, chest tightness and shortness of breath. Cardiovascular:  Positive for chest pain. Negative for palpitations. Gastrointestinal:  Negative for abdominal pain, nausea and vomiting. Endocrine: Negative for polydipsia, polyphagia and polyuria. Genitourinary:  Negative for difficulty urinating, dysuria, hematuria and testicular pain. Musculoskeletal:  Negative for arthralgias and back pain. Skin:  Negative for color change and rash. Allergic/Immunologic: Negative for immunocompromised state. Neurological:  Negative for dizziness, seizures, syncope, weakness and headaches. Hematological:  Negative for adenopathy. Psychiatric/Behavioral:  Negative for confusion and dysphoric mood. All other systems reviewed and are negative. Physical Exam  Physical Exam  Vitals and nursing note reviewed. Constitutional:       General: He is not in acute distress. Appearance: Normal appearance. He is not ill-appearing, toxic-appearing or diaphoretic. HENT:      Head: Normocephalic and atraumatic. Nose: Nose normal. No congestion or rhinorrhea. Mouth/Throat:      Mouth: Mucous membranes are moist.      Pharynx: Oropharynx is clear. No oropharyngeal exudate or posterior oropharyngeal erythema. Comments: Poor dentition  Eyes:      General:         Right eye: No discharge. Left eye: No discharge. Cardiovascular:      Rate and Rhythm: Normal rate and regular rhythm. Pulses: Normal pulses. Heart sounds: Normal heart sounds. No murmur heard. No gallop. Pulmonary:      Effort: Pulmonary effort is normal. No respiratory distress. Breath sounds: Normal breath sounds. No stridor. No wheezing, rhonchi or rales. Chest:      Chest wall: No tenderness. Abdominal:      General: Bowel sounds are normal. There is no distension. Palpations: Abdomen is soft. There is no mass. Tenderness: There is no abdominal tenderness. There is no right CVA tenderness, left CVA tenderness, guarding or rebound. Hernia: No hernia is present. Musculoskeletal:         General: Normal range of motion. Cervical back: Normal range of motion and neck supple. Skin:     General: Skin is warm and dry. Capillary Refill: Capillary refill takes less than 2 seconds. Neurological:      General: No focal deficit present. Mental Status: He is alert and oriented to person, place, and time. Cranial Nerves: No cranial nerve deficit. Sensory: No sensory deficit. Motor: No weakness. Coordination: Coordination normal.      Gait: Gait normal.      Deep Tendon Reflexes: Reflexes normal.   Psychiatric:         Mood and Affect: Mood normal.         Behavior: Behavior normal.         Thought Content:  Thought content normal.         Judgment: Judgment normal.         Vital Signs  ED Triage Vitals   Temperature Pulse Respirations Blood Pressure SpO2   10/24/23 1538 10/24/23 1538 10/24/23 1538 10/24/23 1539 10/24/23 1538   (!) 97 °F (36.1 °C) 70 18 (S) (!) 245/144 98 %      Temp Source Heart Rate Source Patient Position - Orthostatic VS BP Location FiO2 (%)   10/24/23 1538 10/24/23 1701 10/24/23 1538 10/24/23 1538 --   Tympanic Monitor Sitting Left arm       Pain Score       10/24/23 1538       5           Vitals:    10/24/23 1556 10/24/23 1600 10/24/23 1701 10/24/23 1729   BP: (!) 206/127 (!) 184/107 (!) 185/123 (!) 172/108   Pulse:  58 (!) 54 64   Patient Position - Orthostatic VS:   Lying Lying         Visual Acuity      ED Medications  Medications   labetalol (NORMODYNE) injection 20 mg (20 mg Intravenous Given 10/24/23 1556)   cloNIDine (CATAPRES) tablet 0.2 mg (0.2 mg Oral Given 10/24/23 1556)   aspirin chewable tablet 162 mg (162 mg Oral Given 10/24/23 1556)       Diagnostic Studies  Results Reviewed       Procedure Component Value Units Date/Time    Rapid drug screen, urine [[id number]]  (Abnormal) Collected: 10/24/23 1648    Lab Status: Final result Specimen: Urine, Clean Catch Updated: 10/24/23 1736     Amph/Meth UR Negative     Barbiturate Ur Negative     Benzodiazepine Urine Negative     Cocaine Urine Negative     Methadone Urine Negative     Opiate Urine Negative     PCP Ur Negative     THC Urine Positive     Oxycodone Urine Negative    Narrative:      Presumptive report. If requested, specimen will be sent to reference lab for confirmation. FOR MEDICAL PURPOSES ONLY. IF CONFIRMATION NEEDED PLEASE CONTACT THE LAB WITHIN 5 DAYS.     Drug Screen Cutoff Levels:  AMPHETAMINE/METHAMPHETAMINES  1000 ng/mL  BARBITURATES     200 ng/mL  BENZODIAZEPINES     200 ng/mL  COCAINE      300 ng/mL  METHADONE      300 ng/mL  OPIATES      300 ng/mL  PHENCYCLIDINE     25 ng/mL  THC       50 ng/mL  OXYCODONE      100 ng/mL    UA (URINE) with reflex to Scope [[id number]] Collected: 10/24/23 1648    Lab Status: Final result Specimen: Urine, Clean Catch Updated: 10/24/23 1711     Color, UA Straw     Clarity, UA Clear     Specific Gravity, UA 1.010     pH, UA 7.0     Leukocytes, UA Negative     Nitrite, UA Negative     Protein, UA Negative mg/dl      Glucose, UA Negative mg/dl      Ketones, UA Negative mg/dl      Urobilinogen, UA 0.2 E.U./dl      Bilirubin, UA Negative     Occult Blood, UA Negative    HS Troponin 0hr (reflex protocol) [[id number]]  (Normal) Collected: 10/24/23 1552 Lab Status: Final result Specimen: Blood from Arm, Right Updated: 10/24/23 1623     hs TnI 0hr 7 ng/L     Comprehensive metabolic panel [[id number]] Collected: 10/24/23 1552    Lab Status: Final result Specimen: Blood from Arm, Right Updated: 10/24/23 1615     Sodium 136 mmol/L      Potassium 3.9 mmol/L      Chloride 102 mmol/L      CO2 30 mmol/L      ANION GAP 4 mmol/L      BUN 13 mg/dL      Creatinine 0.98 mg/dL      Glucose 101 mg/dL      Calcium 9.3 mg/dL      AST 13 U/L      ALT 14 U/L      Alkaline Phosphatase 80 U/L      Total Protein 7.0 g/dL      Albumin 3.9 g/dL      Total Bilirubin 0.42 mg/dL      eGFR 86 ml/min/1.73sq m     Narrative:      National Kidney Disease Foundation guidelines for Chronic Kidney Disease (CKD):     Stage 1 with normal or high GFR (GFR > 90 mL/min/1.73 square meters)    Stage 2 Mild CKD (GFR = 60-89 mL/min/1.73 square meters)    Stage 3A Moderate CKD (GFR = 45-59 mL/min/1.73 square meters)    Stage 3B Moderate CKD (GFR = 30-44 mL/min/1.73 square meters)    Stage 4 Severe CKD (GFR = 15-29 mL/min/1.73 square meters)    Stage 5 End Stage CKD (GFR <15 mL/min/1.73 square meters)  Note: GFR calculation is accurate only with a steady state creatinine    CBC and differential [[id number]]  (Abnormal) Collected: 10/24/23 1552    Lab Status: Final result Specimen: Blood from Arm, Right Updated: 10/24/23 1559     WBC 5.30 Thousand/uL      RBC 5.26 Million/uL      Hemoglobin 12.9 g/dL      Hematocrit 41.2 %      MCV 78 fL      MCH 24.5 pg      MCHC 31.3 g/dL      RDW 18.0 %      MPV 9.2 fL      Platelets 749 Thousands/uL      nRBC 0 /100 WBCs      Neutrophils Relative 57 %      Immat GRANS % 0 %      Lymphocytes Relative 30 %      Monocytes Relative 8 %      Eosinophils Relative 4 %      Basophils Relative 1 %      Neutrophils Absolute 3.00 Thousands/µL      Immature Grans Absolute 0.02 Thousand/uL      Lymphocytes Absolute 1.59 Thousands/µL      Monocytes Absolute 0.43 Thousand/µL Eosinophils Absolute 0.23 Thousand/µL      Basophils Absolute 0.03 Thousands/µL                    XR chest 2 views    (Results Pending)              Procedures  Procedures         ED Course             HEART Risk Score      Flowsheet Row Most Recent Value   Heart Score Risk Calculator    History 0 Filed at: 10/24/2023 1721   ECG 0 Filed at: 10/24/2023 1721   Age 1 Filed at: 10/24/2023 1721   Risk Factors 1 Filed at: 10/24/2023 1721   Troponin 0 Filed at: 10/24/2023 1721   HEART Score 2 Filed at: 10/24/2023 1721                          SBIRT 20yo+      Flowsheet Row Most Recent Value   Initial Alcohol Screen: US AUDIT-C     1. How often do you have a drink containing alcohol? 1 Filed at: 10/24/2023 1538   2. How many drinks containing alcohol do you have on a typical day you are drinking? 1 Filed at: 10/24/2023 1538   3a. Male UNDER 65: How often do you have five or more drinks on one occasion? 0 Filed at: 10/24/2023 1538   3b. FEMALE Any Age, or MALE 65+: How often do you have 4 or more drinks on one occassion? 0 Filed at: 10/24/2023 1538   Audit-C Score 2 Filed at: 10/24/2023 1538   PITO: How many times in the past year have you. .. Used an illegal drug or used a prescription medication for non-medical reasons? Never Filed at: 10/24/2023 [address]  3292: Patient appears well, vital signs reviewed. Patient with atypical right-sided chest discomfort only with movement of his right arm. EKG nonischemic. Placed on the monitor. Patient has a history of hypertension and hypertensive urgency and has been admitted to our hospital previously for similar. Found to be severely hypertensive. Previous times were related to noncompliance and methamphetamine use. Patient states he is taking his medications and denies any recent substance abuse. Plan to complete basic labs including cardiac enzymes. Send urine drug screen. Check chest x-ray. Chest pain-free at this time.   I will give antihypertensives, aspirin, reevaluate. 1700: Chest x-ray and labs reviewed. Patient's blood pressure continues to improve. He is informed to follow-up with his PCP and cardiologist as scheduled for further management of his blood pressure. Amount and/or Complexity of Data Reviewed  Labs: ordered. Radiology: ordered and independent interpretation performed. Details: Chest x-ray no acute pathology  ECG/medicine tests: ordered and independent interpretation performed. Details: Normal sinus rhythm 67 bpm, LVH, no acute ischemia    Risk  OTC drugs. Prescription drug management. Disposition  Final diagnoses:   Chest pain, unspecified type   Hypertension     Time reflects when diagnosis was documented in both MDM as applicable and the Disposition within this note       Time User Action Codes Description Comment    10/24/2023  5:20 PM Arash Durham Add [R07.9] Chest pain, unspecified type     10/24/2023  5:20 PM [facility]           ED Disposition       ED Disposition   Discharge    Condition   Stable    Date/Time   Tue Oct 24, 2023 [address] discharge to home/self care.                    Follow-up Information       Follow up With Specialties Details Why Contact Itzel Gutierrez August, DO Family Medicine Schedule an appointment as soon as possible for a visit in 1 day  [address]  [phone]              Discharge Medication List as of 10/24/2023  5:21 PM        CONTINUE these medications which have NOT CHANGED    Details   aspirin (ECOTRIN LOW STRENGTH) 81 mg EC tablet Take 1 tablet (81 mg total) by mouth daily Do not start before April 29, 2023., Starting Sat 4/29/2023, Normal      carvedilol (COREG) 6.25 mg tablet Take 1 tablet (6.25 mg total) by mouth 2 (two) times a day with meals, Starting Fri 4/28/2023, Until Sun 5/28/2023, Normal      losartan (COZAAR) 25 mg tablet Take 1 tablet (25 mg total) by mouth daily Do not start before April 29, 2023., Starting Sat 4/29/2023, Until Mon 5/29/2023, Normal             No discharge procedures on file.     PDMP Review         Value Time User    PDMP Reviewed  Yes 4/28/2023  9:34 AM Kat Stafford MD            ED Provider  Electronically Signed by             Patty Brokos MD  10/24/23 9758

## 2023-10-25 LAB
ATRIAL RATE: 67 BPM
P AXIS: 65 DEGREES
PR INTERVAL: 170 MS
QRS AXIS: 90 DEGREES
QRSD INTERVAL: 124 MS
QT INTERVAL: 408 MS
QTC INTERVAL: 431 MS
T WAVE AXIS: 69 DEGREES
VENTRICULAR RATE: 67 BPM

## 2023-10-25 PROCEDURE — 93010 ELECTROCARDIOGRAM REPORT: CPT | Performed by: INTERNAL MEDICINE

## 2024-03-04 ENCOUNTER — OFFICE VISIT (OUTPATIENT)
Dept: CARDIOLOGY CLINIC | Facility: CLINIC | Age: 54
End: 2024-03-04
Payer: COMMERCIAL

## 2024-03-04 VITALS
OXYGEN SATURATION: 99 % | HEIGHT: 67 IN | HEART RATE: 68 BPM | BODY MASS INDEX: 28.28 KG/M2 | SYSTOLIC BLOOD PRESSURE: 168 MMHG | DIASTOLIC BLOOD PRESSURE: 102 MMHG | WEIGHT: 180.2 LBS

## 2024-03-04 DIAGNOSIS — I16.1 HYPERTENSIVE EMERGENCY: ICD-10-CM

## 2024-03-04 DIAGNOSIS — R55 NEAR SYNCOPE: ICD-10-CM

## 2024-03-04 DIAGNOSIS — R06.09 DOE (DYSPNEA ON EXERTION): Primary | ICD-10-CM

## 2024-03-04 DIAGNOSIS — I5A NONISCHEMIC NONTRAUMATIC MYOCARDIAL INJURY: ICD-10-CM

## 2024-03-04 PROCEDURE — 99204 OFFICE O/P NEW MOD 45 MIN: CPT | Performed by: INTERNAL MEDICINE

## 2024-03-04 RX ORDER — HYDROCHLOROTHIAZIDE 25 MG/1
25 TABLET ORAL DAILY
COMMUNITY

## 2024-03-04 NOTE — PROGRESS NOTES
Cardiology             Adan Read  1970  [id number]              Assessment/Plan:    Exertional dyspnea  Ongoing tobacco use, 1 pack/day  Mildly elevated troponin after syncopal episode 4/2023, likely secondary to vasovagal etiology and hypertension  History of narcotic abuse, methamphetamine use  Ongoing marijuana use on occasion      This patient's exertional dyspnea and multiple cardiovascular risk factors, I will schedule him for an exercise nuclear stress test.  With exertional dyspne we will check an echocardiogram  Strongly encouraged tobacco cessation  He is under the impression that he had a myocardial infarction in April 2023 when he was hospitalized with hypertension, vasovagal syncope, and a minimal troponin elevation.  His ECG revealed no evidence of myocardial infarction and his echocardiogram revealed no regional wall motion abnormalities.  He has been reassured that this may have been nonischemic myocardial injury.  We will further clarify this after completion of his testing.      Follow-up in 1 month        Interval History:     This is a 53-year-old male who was hospitalized 4/2023 with a syncopal episode.  Apparently was evaluated and thereafter fell backwards.  He had significant sweats but no chest pain, arm pain, shortness of breath, or headache.  He was hypertensive upon arrival and initiated on Cozaar.  Carvedilol was continued.  Later he was initiated on HCTZ for better blood pressure control.  Echocardiogram demonstrated left ejection fraction 55%.  High-sensitivity troponin was minimally elevated up to 99.  He was positive for meth use.    He presents today to establish cardiovascular care.  From a symptomatic standpoint he feels shortness of breath which she states has been increasing for the past year.  He continues to smoke 1 pack/day.  He denies heavy alcohol use and occasionally smokes marijuana.  He states he does not use methamphetamine anymore.  He does not formally  "exercise but states that he engages in exertional activities at work.  He feels short of breath with no chest discomfort, palpitations, lightheadedness, or presyncope.  He had no recurrent syncopal episodes.  He denies any family history of cardiovascular disease.                       Vitals:  Vitals:    03/04/24 1101   BP: (!) 168/102   Pulse: 68   SpO2: 99%   Weight: 81.7 kg (180 lb 3.2 oz)   Height: 5' 7\" (1.702 m)         Past Medical History:   Diagnosis Date   • Hypertension      Social History     Socioeconomic History   • Marital status: /Civil Union     Spouse name: Not on file   • Number of children: Not on file   • Years of education: Not on file   • Highest education level: Not on file   Occupational History   • Not on file   Tobacco Use   • Smoking status: Every Day     Types: Cigarettes   • Smokeless tobacco: Never   Vaping Use   • Vaping status: Never Used   Substance and Sexual Activity   • Alcohol use: Not Currently   • Drug use: Yes     Types: Marijuana   • Sexual activity: Not on file   Other Topics Concern   • Not on file   Social History Narrative   • Not on file     Social Determinants of Health     Financial Resource Strain: Not on file   Food Insecurity: No Food Insecurity (5/11/2023)    Received from [facility]    Hunger Vital Sign    • Worried About Running Out of Food in the Last Year: Never true    • Ran Out of Food in the Last Year: Never true   Transportation Needs: No Transportation Needs (4/27/2023)    PRAPARE - Transportation    • Lack of Transportation (Medical): No    • Lack of Transportation (Non-Medical): No   Physical Activity: Not on file   Stress: Not on file   Social Connections: Not on file   Intimate Partner Violence: Not on file   Housing Stability: Low Risk  (4/27/2023)    Housing Stability Vital Sign    • Unable to Pay for Housing in the Last Year: No    • Number of Places Lived in the Last Year: 1    • Unstable Housing in the Last Year: No      History reviewed. " No pertinent family history.  History reviewed. No pertinent surgical history.    Current Outpatient Medications:   •  aspirin (ECOTRIN LOW STRENGTH) 81 mg EC tablet, Take 1 tablet (81 mg total) by mouth daily Do not start before April 29, 2023., Disp: 30 tablet, Rfl: 0  •  carvedilol (COREG) 6.25 mg tablet, Take 1 tablet (6.25 mg total) by mouth 2 (two) times a day with meals, Disp: 60 tablet, Rfl: 0  •  hydroCHLOROthiazide 25 mg tablet, Take 25 mg by mouth daily, Disp: , Rfl:   •  losartan (COZAAR) 25 mg tablet, Take 1 tablet (25 mg total) by mouth daily Do not start before April 29, 2023., Disp: 30 tablet, Rfl: 0        Review of Systems:  Review of Systems   Respiratory:  Positive for shortness of breath.    Cardiovascular: Negative.    All other systems reviewed and are negative.        Physical Exam:  Physical Exam  Constitutional:       General: He is not in acute distress.     Appearance: He is well-developed. He is not diaphoretic.   HENT:      Head: Normocephalic and atraumatic.   Eyes:      General: No scleral icterus.        Right eye: No discharge.      Pupils: Pupils are equal, round, and reactive to light.   Neck:      Thyroid: No thyromegaly.   Cardiovascular:      Rate and Rhythm: Normal rate and regular rhythm.      Heart sounds: Normal heart sounds. No murmur heard.     No friction rub. No gallop.   Pulmonary:      Effort: Pulmonary effort is normal.      Breath sounds: Normal breath sounds.   Abdominal:      General: There is no distension.      Tenderness: There is no abdominal tenderness. There is no guarding or rebound.   Musculoskeletal:         General: Normal range of motion.      Cervical back: Normal range of motion and neck supple.   Skin:     General: Skin is warm and dry.      Coloration: Skin is not pale.      Findings: No erythema or rash.   Neurological:      Mental Status: He is alert and oriented to person, place, and time.      Coordination: Coordination normal.      Gait: Gait  abnormal.   Psychiatric:         Behavior: Behavior normal.         Thought Content: Thought content normal.         Judgment: Judgment normal.         This note was completed in part utilizing M-Modal Fluency Direct Software.  Grammatical errors, random word insertions, spelling mistakes, and incomplete sentences can be an occasional consequence of this system secondary to software limitations, ambient noise, and hardware issues.  If you have any questions or concerns about the content, text, or information contained within the body of this dictation, please contact the provider for clarification.

## 2024-03-26 ENCOUNTER — HOSPITAL ENCOUNTER (OUTPATIENT)
Dept: NUCLEAR MEDICINE | Facility: HOSPITAL | Age: 54
Discharge: HOME | End: 2024-03-26
Attending: INTERNAL MEDICINE
Payer: COMMERCIAL

## 2024-03-26 ENCOUNTER — HOSPITAL ENCOUNTER (OUTPATIENT)
Dept: NON INVASIVE DIAGNOSTICS | Facility: HOSPITAL | Age: 54
Discharge: HOME | End: 2024-03-26
Attending: INTERNAL MEDICINE
Payer: COMMERCIAL

## 2024-03-26 VITALS
BODY MASS INDEX: 28.25 KG/M2 | HEART RATE: 70 BPM | WEIGHT: 180 LBS | SYSTOLIC BLOOD PRESSURE: 150 MMHG | HEIGHT: 67 IN | DIASTOLIC BLOOD PRESSURE: 70 MMHG

## 2024-03-26 DIAGNOSIS — R06.09 DOE (DYSPNEA ON EXERTION): ICD-10-CM

## 2024-03-26 LAB
AORTIC ROOT: 3.5 CM
APICAL FOUR CHAMBER EJECTION FRACTION: 53 %
E WAVE DECELERATION TIME: 288 MS
E/A RATIO: 0.84
FRACTIONAL SHORTENING: 37 (ref 28–44)
INTERVENTRICULAR SEPTUM IN DIASTOLE (PARASTERNAL SHORT AXIS VIEW): 1 CM
INTERVENTRICULAR SEPTUM: 1 CM (ref 0.6–1.1)
LAAS-AP2: 16.1 CM2
LAAS-AP4: 15 CM2
LEFT ATRIUM SIZE: 3.6 CM
LEFT ATRIUM VOLUME (MOD BIPLANE): 38 ML
LEFT ATRIUM VOLUME INDEX (MOD BIPLANE): 19.7 ML/M2
LEFT INTERNAL DIMENSION IN SYSTOLE: 3.1 CM (ref 2.1–4)
LEFT VENTRICLE DIASTOLIC VOLUME (MOD BIPLANE): 50 ML
LEFT VENTRICLE SYSTOLIC VOLUME (MOD BIPLANE): 20 ML
LEFT VENTRICULAR INTERNAL DIMENSION IN DIASTOLE: 4.9 CM (ref 3.5–6)
LEFT VENTRICULAR POSTERIOR WALL IN END DIASTOLE: 1 CM
LEFT VENTRICULAR STROKE VOLUME: 78 ML
LV EF: 60 %
LVSV (TEICH): 78 ML
MAX HR PERCENT: 69 %
MAX HR: 114 BPM
MV E'TISSUE VEL-LAT: 9 CM/S
MV E'TISSUE VEL-SEP: 7 CM/S
MV PEAK A VEL: 0.79 M/S
MV PEAK E VEL: 66 CM/S
MV STENOSIS PRESSURE HALF TIME: 83 MS
MV VALVE AREA P 1/2 METHOD: 2.7
NUC STRESS EJECTION FRACTION: 51 %
RATE PRESSURE PRODUCT: NORMAL
RIGHT ATRIUM AREA SYSTOLE A4C: 7.6 CM2
RIGHT VENTRICLE ID DIMENSION: 3.8 CM
SL CV LEFT ATRIUM LENGTH A2C: 5.3 CM
SL CV LV EF: 55
SL CV PED ECHO LEFT VENTRICLE DIASTOLIC VOLUME (MOD BIPLANE) 2D: 115 ML
SL CV PED ECHO LEFT VENTRICLE SYSTOLIC VOLUME (MOD BIPLANE) 2D: 37 ML
SL CV REST NUCLEAR ISOTOPE DOSE: 11 MCI
SL CV STRESS NUCLEAR ISOTOPE DOSE: 32.4 MCI
SL CV STRESS RECOVERY BP: NORMAL MMHG
SL CV STRESS RECOVERY HR: 77 BPM
SL CV STRESS RECOVERY O2 SAT: 100 %
SL CV STRESS STAGE REACHED: 3
STRESS ANGINA INDEX: 0
STRESS BASELINE BP: NORMAL MMHG
STRESS BASELINE HR: 64 BPM
STRESS O2 SAT REST: 94 %
STRESS PEAK HR: 114 BPM
STRESS POST ESTIMATED WORKLOAD: 10.1 METS
STRESS POST EXERCISE DUR MIN: 7 MIN
STRESS POST EXERCISE DUR SEC: 0 SEC
STRESS POST O2 SAT PEAK: 98 %
STRESS POST PEAK BP: 170 MMHG
STRESS/REST PERFUSION RATIO: 1.15
TRICUSPID ANNULAR PLANE SYSTOLIC EXCURSION: 1.6 CM

## 2024-03-26 PROCEDURE — 93306 TTE W/DOPPLER COMPLETE: CPT

## 2024-03-26 PROCEDURE — 78452 HT MUSCLE IMAGE SPECT MULT: CPT | Performed by: STUDENT IN AN ORGANIZED HEALTH CARE EDUCATION/TRAINING PROGRAM

## 2024-03-26 PROCEDURE — 93306 TTE W/DOPPLER COMPLETE: CPT | Performed by: STUDENT IN AN ORGANIZED HEALTH CARE EDUCATION/TRAINING PROGRAM

## 2024-03-26 PROCEDURE — 78452 HT MUSCLE IMAGE SPECT MULT: CPT

## 2024-03-26 PROCEDURE — 93018 CV STRESS TEST I&R ONLY: CPT | Performed by: STUDENT IN AN ORGANIZED HEALTH CARE EDUCATION/TRAINING PROGRAM

## 2024-03-26 PROCEDURE — A9502 TC99M TETROFOSMIN: HCPCS

## 2024-03-26 PROCEDURE — 93017 CV STRESS TEST TRACING ONLY: CPT

## 2024-03-26 PROCEDURE — 93016 CV STRESS TEST SUPVJ ONLY: CPT | Performed by: STUDENT IN AN ORGANIZED HEALTH CARE EDUCATION/TRAINING PROGRAM

## 2024-03-26 RX ORDER — REGADENOSON 0.08 MG/ML
0.4 INJECTION, SOLUTION INTRAVENOUS ONCE
Status: COMPLETED | OUTPATIENT
Start: 2024-03-26 | End: 2024-03-26

## 2024-03-26 RX ADMIN — REGADENOSON 0.4 MG: 0.08 INJECTION, SOLUTION INTRAVENOUS at 10:07

## 2024-03-29 LAB
CHEST PAIN STATEMENT: NORMAL
MAX DIASTOLIC BP: 90 MMHG
MAX PREDICTED HEART RATE: 165 BPM
PROTOCOL NAME: NORMAL
STRESS POST EXERCISE DUR MIN: 7 MIN
STRESS POST EXERCISE DUR SEC: 0 SEC
STRESS POST PEAK HR: 114 BPM
STRESS POST PEAK SYSTOLIC BP: 170 MMHG
TARGET HR FORMULA: NORMAL
TEST INDICATION: NORMAL

## 2024-04-26 NOTE — PROGRESS NOTES
Cardiology Follow Up    Adan Read  1970  [id number]  [facility] [place] [address]  [address]  [facility] [address] [phone]    Adan presents for routine follow up of syncope, HTN.    1. Primary hypertension  Assessment & Plan:  BP is elevated today, however patient reports excellent control at home.  Will bring him back with his home cuff to confirm accuracy.  Continue carvedilol 6.25 mg BID, losartan 100 mg daily, HCTZ 25 mg daily.  BMP now.  Reviewed importance of tobacco cessation.    Orders:  -     Basic metabolic panel; Future    2. Near syncope  Assessment & Plan:  Hospitalized with near syncope in the setting of hypertensive urgency in 4/2023.  Echo showed preserved LVEF with mild LVH.  Repeat echo 3/26/2024 shows no LVH.  Nuclear exercise stress test 3/26/2024 shows no evidence of ischemia.  No recurrent episodes.  Would consider cardiac monitoring vs loop if recurrent syncope/near syncope.      3. Substance abuse (HCC)  Assessment & Plan:  Ongoing tobacco use, urged complete cessation.  Smokes marijuana.  History of meth use but reports complete abstinence.           HPI  Adan has a past medical history of syncope, HTN, polysubstance use.    He was referred to cardiology after a hospitalization in April 2023 for syncope. HS trop was minimally elevated. Echo showed preserved LVEF with mildly dilated aortic root. He was initially hypertensive and underwent medication adjustments with addition of losartan/HCTZ as well as carvedilol. Urine drug screen was positive for methamphetamines.    He established with [facility] when he met with Dr. Tammi Smalls on 3/4/2024. He noted progressive shortness of breath. He reported tobacco use, smoking 1 PPD. He denied heavy ETOH use. Smoked marijuana. Denied ongoing meth use. He denied chest pain. No recurrent syncope. He denied any known family history of CAD. Updated echocardiogram and  nuclear stress testing were ordered and completed 3/26/2024 showing no concerning findings (full report detail below).    4/29/2024: Adan presents today accompanied by his spouse for routine follow up. He states he is feeling very well. No cardiac complaints. He is taking and tolerating his medications. He denies side effects. He is monitoring his BP at home which he states is typically in the 120's systolic. He continues to smoke about 1 PPD of cigarettes as well as marijuana. He denies use of meth. No chest pain, shortness of breath, recurrent syncope or near syncope. We reviewed his echo and stress test in detail. He is due for routine labs.     Medical Problems       Problem List       Near syncope    Hypertensive urgency    Nonischemic nontraumatic myocardial injury    Nonrheumatic tricuspid valve regurgitation    Substance abuse (HCC)        Past Medical History:   Diagnosis Date    Hypertension      Social History     Socioeconomic History    Marital status: /Civil Union     Spouse name: Not on file    Number of children: Not on file    Years of education: Not on file    Highest education level: Not on file   Occupational History    Not on file   Tobacco Use    Smoking status: Every Day     Current packs/day: 1.00     Types: Cigarettes    Smokeless tobacco: Never   Vaping Use    Vaping status: Never Used   Substance and Sexual Activity    Alcohol use: Not Currently     Comment: 1-2 times per year    Drug use: Yes     Types: Marijuana    Sexual activity: Not on file     Comment: defer   Other Topics Concern    Not on file   Social History Narrative    Not on file     Social Determinants of Health     Financial Resource Strain: Not on file   Food Insecurity: No Food Insecurity (5/11/2023)    Received from [facility]    Hunger Vital Sign     Worried About Running Out of Food in the Last Year: Never true     Ran Out of Food in the Last Year: Never true   Transportation Needs: No Transportation Needs  (4/27/2023)    PRAPARE - Transportation     Lack of Transportation (Medical): No     Lack of Transportation (Non-Medical): No   Physical Activity: Not on file   Stress: Not on file   Social Connections: Not on file   Intimate Partner Violence: Not on file   Housing Stability: Low Risk  (4/27/2023)    Housing Stability Vital Sign     Unable to Pay for Housing in the Last Year: No     Number of Places Lived in the Last Year: 1     Unstable Housing in the Last Year: No      Family History   Problem Relation Age of Onset    Hypertension Mother     Hypertension Father     Hypertension Sister     Hypertension Brother      History reviewed. No pertinent surgical history.    Current Outpatient Medications:     aspirin (ECOTRIN LOW STRENGTH) 81 mg EC tablet, Take 1 tablet (81 mg total) by mouth daily Do not start before April 29, 2023., Disp: 30 tablet, Rfl: 0    carvedilol (COREG) 6.25 mg tablet, Take 1 tablet (6.25 mg total) by mouth 2 (two) times a day with meals, Disp: 60 tablet, Rfl: 0    hydroCHLOROthiazide 25 mg tablet, Take 25 mg by mouth daily, Disp: , Rfl:     losartan (COZAAR) 100 MG tablet, Take 100 mg by mouth daily, Disp: , Rfl:   No Known Allergies    Labs:     Chemistry        Component Value Date/Time    K 3.9 10/24/2023 1552     10/24/2023 1552    CO2 30 10/24/2023 1552    BUN 13 10/24/2023 1552    CREATININE 0.98 10/24/2023 1552        Component Value Date/Time    CALCIUM 9.3 10/24/2023 1552    ALKPHOS 80 10/24/2023 1552    AST 13 10/24/2023 1552    ALT 14 10/24/2023 1552        Lipid panel 11/10/2023: C 146. T 79. H 40. L 90.    Cardiac testing:  Exercise nuclear stress test 3/26/2024:    A Josef protocol stress test was performed. Overall, the patient's exercise capacity was mildly impaired for their age. The patient reached stage 3.0of the protocol after exercising for 7 min and 0 sec and had a maximal HR of 114 bpm (69% of MPHR) and 10.1 METS. The patient experienced no angina during the test.     Blood pressure demonstrated a normal response and heart rate demonstrated a blunted (most likely due to beta-blocker intake) response to stress.    Stress Function: Left ventricular function post-stress is normal. Stress ejection fraction is 51%.    Stress ECG: There were no arrhythmias during stress. The stress ECG is negative for ischemia after submaximal exercise, without reproduction of symptoms.    Perfusion: There is a left ventricular perfusion defect that is medium in size with mild reduction in uptake present in the entire inferior location(s) that is paradoxical and fully resolved with proning. The defect appears to be an artifact caused by diaphragmatic activity.    Stress Combined Conclusion: Test was submaximal due to inability to achieve target heart rate. At achieved exercise load there is image artifact, without diagnostic evidence for perfusion abnormality.    Echo 3/26/2024:    Left Ventricle: Left ventricular cavity size is normal. Wall thickness is normal. The left ventricular ejection fraction is 55%. Systolic function is normal. Wall motion is normal. Diastolic function is normal.    Aorta: The aortic root is mildly dilated. The aortic root is 3.50 cm.    Echo 4/27/2023:    Left Ventricle: Left ventricular cavity size is normal. Wall thickness is increased. There is mild concentric hypertrophy. The left ventricular ejection fraction is 55%. Systolic function is normal. Wall motion is normal. Diastolic function is mildly abnormal, consistent with grade I (abnormal) relaxation.  Left atrial filling pressure is normal.    Left Atrium: The atrium is mildly dilated.    Aorta: The aortic root is mildly dilated. The ascending aorta is normal in size. The aortic root is 3.80 cm. The ascending aorta is 3.4 cm.    Mitral Valve: Mitral valve structure is normal. There is trace regurgitation. There is no evidence of stenosis.    Tricuspid Valve: Tricuspid valve structure is normal. There is trace  "regurgitation. There is no evidence of stenosis. Right ventricular systolic pressure could not be assessed.    Review of Systems   Constitutional: Negative.   HENT: Negative.     Cardiovascular:  Negative for chest pain, dyspnea on exertion, irregular heartbeat, leg swelling, near-syncope, orthopnea and palpitations.   Respiratory:  Negative for cough and snoring.    Endocrine: Negative.    Skin: Negative.    Musculoskeletal: Negative.    Gastrointestinal: Negative.    Genitourinary: Negative.    Neurological: Negative.    Psychiatric/Behavioral: Negative.         Vitals:    04/29/24 0758   BP: 160/90   Pulse: 74   Temp: (!) 97.2 °F (36.2 °C)   SpO2: 98%     Vitals:    04/29/24 0758   Weight: 81.2 kg (179 lb)     Height: 5' 7\" (170.2 cm)   Body mass index is 28.04 kg/m².    Physical Exam  Vitals and nursing note reviewed.   Constitutional:       General: He is not in acute distress.     Appearance: He is well-developed. He is not diaphoretic.   HENT:      Head: Normocephalic and atraumatic.   Neck:      Vascular: No carotid bruit or JVD.   Cardiovascular:      Rate and Rhythm: Normal rate and regular rhythm. No extrasystoles are present.     Pulses: Intact distal pulses.      Heart sounds: Normal heart sounds, S1 normal and S2 normal. No murmur heard.     No friction rub. No gallop.      Comments: No edema  Pulmonary:      Effort: Pulmonary effort is normal. No respiratory distress.      Breath sounds: Normal breath sounds.   Abdominal:      General: There is no distension.      Palpations: Abdomen is soft.      Tenderness: There is no abdominal tenderness.   Skin:     General: Skin is warm and dry.      Findings: No rash.   Neurological:      Mental Status: He is alert and oriented to person, place, and time.   Psychiatric:         Behavior: Behavior normal.                "

## 2024-04-29 ENCOUNTER — OFFICE VISIT (OUTPATIENT)
Dept: CARDIOLOGY CLINIC | Facility: CLINIC | Age: 54
End: 2024-04-29
Payer: COMMERCIAL

## 2024-04-29 VITALS
WEIGHT: 179 LBS | HEART RATE: 74 BPM | SYSTOLIC BLOOD PRESSURE: 160 MMHG | OXYGEN SATURATION: 98 % | DIASTOLIC BLOOD PRESSURE: 90 MMHG | HEIGHT: 67 IN | BODY MASS INDEX: 28.09 KG/M2 | TEMPERATURE: 97.2 F

## 2024-04-29 DIAGNOSIS — R55 NEAR SYNCOPE: ICD-10-CM

## 2024-04-29 DIAGNOSIS — I10 PRIMARY HYPERTENSION: Primary | ICD-10-CM

## 2024-04-29 DIAGNOSIS — F19.10 SUBSTANCE ABUSE (HCC): ICD-10-CM

## 2024-04-29 PROBLEM — I16.0 HYPERTENSIVE URGENCY: Status: RESOLVED | Noted: 2023-04-26 | Resolved: 2024-04-29

## 2024-04-29 PROBLEM — I36.1 NONRHEUMATIC TRICUSPID VALVE REGURGITATION: Status: RESOLVED | Noted: 2023-04-28 | Resolved: 2024-04-29

## 2024-04-29 PROCEDURE — 99214 OFFICE O/P EST MOD 30 MIN: CPT | Performed by: NURSE PRACTITIONER

## 2024-04-29 RX ORDER — LOSARTAN POTASSIUM 100 MG/1
100 TABLET ORAL DAILY
COMMUNITY

## 2024-04-29 NOTE — ASSESSMENT & PLAN NOTE
BP is elevated today, however patient reports excellent control at home.  Will bring him back with his home cuff to confirm accuracy.  Continue carvedilol 6.25 mg BID, losartan 100 mg daily, HCTZ 25 mg daily.  BMP now.  Reviewed importance of tobacco cessation.

## 2024-04-29 NOTE — ASSESSMENT & PLAN NOTE
Ongoing tobacco use, urged complete cessation.  Smokes marijuana.  History of meth use but reports complete abstinence.

## 2024-04-29 NOTE — ASSESSMENT & PLAN NOTE
Hospitalized with near syncope in the setting of hypertensive urgency in 4/2023.  Echo showed preserved LVEF with mild LVH.  Repeat echo 3/26/2024 shows no LVH.  Nuclear exercise stress test 3/26/2024 shows no evidence of ischemia.  No recurrent episodes.  Would consider cardiac monitoring vs loop if recurrent syncope/near syncope.

## 2024-04-29 NOTE — PATIENT INSTRUCTIONS
Please work on smoking cessation.  We will bring you back with your home cuff to confirm accuracy. If BP is staying > 140/90 we will adjust your medications.  Please complete a basic metabolic blood test - no fasting required.  Your echo shows normal heart function with mild dilation of your aorta which we will recheck in 1 year.  Stress test shows no blockages.

## 2024-05-08 ENCOUNTER — CLINICAL SUPPORT (OUTPATIENT)
Dept: CARDIOLOGY CLINIC | Facility: CLINIC | Age: 54
End: 2024-05-08
Payer: COMMERCIAL

## 2024-05-08 ENCOUNTER — TELEPHONE (OUTPATIENT)
Dept: CARDIOLOGY CLINIC | Facility: CLINIC | Age: 54
End: 2024-05-08

## 2024-05-08 VITALS
HEART RATE: 72 BPM | DIASTOLIC BLOOD PRESSURE: 92 MMHG | HEIGHT: 67 IN | TEMPERATURE: 98.2 F | SYSTOLIC BLOOD PRESSURE: 134 MMHG | OXYGEN SATURATION: 98 % | WEIGHT: 170.6 LBS | BODY MASS INDEX: 26.78 KG/M2

## 2024-05-08 DIAGNOSIS — I10 HYPERTENSION, UNSPECIFIED TYPE: Primary | ICD-10-CM

## 2024-05-08 PROCEDURE — 99212 OFFICE O/P EST SF 10 MIN: CPT

## 2024-05-08 NOTE — TELEPHONE ENCOUNTER
I reviewed Adan's BP at nurse visit. His home cuff is accurate. Monitor BP at home and report if staying > 130 on the top or > 90 on the bottom.  Thank you!

## 2024-08-25 NOTE — PROGRESS NOTES
Cardiology Follow Up    Adan Read  1970  [id number]  [facility] [place] [address]  [address]  [facility] [address] [phone]    Adan presents for routine follow up of syncope, HTN.     1. Primary hypertension  Assessment & Plan:  BP is much improved and now at goal.  Accuracy of home BP cuff confirmed at previous OV.  Continue carvedilol 6.25 mg BID, losartan 100 mg daily, HCTZ 25 mg daily.  CMP now.  Reviewed importance of tobacco cessation.  Orders:  -     Comprehensive metabolic panel; Future  2. Near syncope  Assessment & Plan:  Hospitalized with near syncope in the setting of hypertensive urgency in 4/2023.  Echo showed preserved LVEF with mild LVH.  Repeat echo 3/26/2024 shows no LVH.  Nuclear exercise stress test 3/26/2024 shows no evidence of ischemia.  No recurrent episodes.  Would consider cardiac monitoring vs loop if recurrent syncope/near syncope.  3. Substance abuse (HCC)  Assessment & Plan:  Ongoing tobacco use, urged complete cessation.  Smokes marijuana.  History of meth use but reports complete abstinence.     NAIF Arellano has a past medical history of syncope, HTN, polysubstance use.     He was referred to cardiology after a hospitalization in April 2023 for syncope. HS trop was minimally elevated. Echo showed preserved LVEF with mildly dilated aortic root. He was initially hypertensive and underwent medication adjustments with addition of losartan/HCTZ as well as carvedilol. Urine drug screen was positive for methamphetamines.     He established with [facility] when he met with Dr. Tammi Smalls on 3/4/2024. He noted progressive shortness of breath. He reported tobacco use, smoking 1 PPD. He denied heavy ETOH use. Smoked marijuana. Denied ongoing meth use. He denied chest pain. No recurrent syncope. He denied any known family history of CAD. Updated echocardiogram and nuclear stress testing were ordered and completed  3/26/2024 showing no concerning findings (full report detail below).    He followed up 4/29/2024 at which time he was doing well. He stated he was taking medications without side effects. He obtained a home BP cuff and reported SBP in the 120's. We reviewed his test results. Updated labs were ordered.     8/28/2024: Adan presents for follow up. He brought his home BP cuff after his last visit and it was accurate compared to our manual reading. He states BP is typically 120-130's/80's at home. He continues to smoke about 1 PPD. He denies meth use. He denies any complaints. No recurrent syncope/near syncope. No chest pain, shortness of breath, edema. He states he is taking his medications faithfully. He forgot to complete the labs ordered at his last visit.     Medical Problems       Problem List       Primary hypertension    Near syncope    Substance abuse (HCC)    Nonischemic nontraumatic myocardial injury        Past Medical History:   Diagnosis Date    Hypertension      Social History     Socioeconomic History    Marital status: /Civil Union     Spouse name: Not on file    Number of children: Not on file    Years of education: Not on file    Highest education level: Not on file   Occupational History    Not on file   Tobacco Use    Smoking status: Every Day     Current packs/day: 1.00     Types: Cigarettes    Smokeless tobacco: Never   Vaping Use    Vaping status: Never Used   Substance and Sexual Activity    Alcohol use: Not Currently     Comment: 1-2 times per year    Drug use: Yes     Types: Marijuana    Sexual activity: Not on file     Comment: defer   Other Topics Concern    Not on file   Social History Narrative    Not on file     Social Determinants of Health     Financial Resource Strain: Not on file   Food Insecurity: No Food Insecurity (5/11/2023)    Received from [facility]    Hunger Vital Sign     Worried About Running Out of Food in the Last Year: Never true     Ran Out of Food in the  Last Year: Never true   Transportation Needs: No Transportation Needs (4/27/2023)    PRAPARE - Transportation     Lack of Transportation (Medical): No     Lack of Transportation (Non-Medical): No   Physical Activity: Not on file   Stress: Not on file   Social Connections: Unknown (6/18/2024)    Received from [facility] [employer]    Social [place]     How often do you feel lonely or isolated from those around you? (Adult - for ages 18 years and over): Not on file   Intimate Partner Violence: Not on file   Housing Stability: Low Risk  (4/27/2023)    Housing Stability Vital Sign     Unable to Pay for Housing in the Last Year: No     Number of Places Lived in the Last Year: 1     Unstable Housing in the Last Year: No      Family History   Problem Relation Age of Onset    Hypertension Mother     Hypertension Father     Hypertension Sister     Hypertension Brother      History reviewed. No pertinent surgical history.    Current Outpatient Medications:     aspirin (ECOTRIN LOW STRENGTH) 81 mg EC tablet, Take 1 tablet (81 mg total) by mouth daily Do not start before April 29, 2023., Disp: 30 tablet, Rfl: 0    carvedilol (COREG) 6.25 mg tablet, Take 1 tablet (6.25 mg total) by mouth 2 (two) times a day with meals, Disp: 60 tablet, Rfl: 0    hydroCHLOROthiazide 25 mg tablet, Take 25 mg by mouth daily, Disp: , Rfl:     losartan (COZAAR) 100 MG tablet, Take 100 mg by mouth daily, Disp: , Rfl:   No Known Allergies    Labs:     Chemistry        Component Value Date/Time    K 3.9 10/24/2023 1552     10/24/2023 1552    CO2 30 10/24/2023 1552    BUN 13 10/24/2023 1552    CREATININE 0.98 10/24/2023 1552        Component Value Date/Time    CALCIUM 9.3 10/24/2023 1552    ALKPHOS 80 10/24/2023 1552    AST 13 10/24/2023 1552    ALT 14 10/24/2023 1552        Lipid panel 11/10/2023: C 146. T 79. H 40. L 90.     Cardiac testing:  Exercise nuclear stress test 3/26/2024:    A Josef protocol stress test was performed. Overall, the patient's  exercise capacity was mildly impaired for their age. The patient reached stage 3.0of the protocol after exercising for 7 min and 0 sec and had a maximal HR of 114 bpm (69% of MPHR) and 10.1 METS. The patient experienced no angina during the test.    Blood pressure demonstrated a normal response and heart rate demonstrated a blunted (most likely due to beta-blocker intake) response to stress.    Stress Function: Left ventricular function post-stress is normal. Stress ejection fraction is 51%.    Stress ECG: There were no arrhythmias during stress. The stress ECG is negative for ischemia after submaximal exercise, without reproduction of symptoms.    Perfusion: There is a left ventricular perfusion defect that is medium in size with mild reduction in uptake present in the entire inferior location(s) that is paradoxical and fully resolved with proning. The defect appears to be an artifact caused by diaphragmatic activity.    Stress Combined Conclusion: Test was submaximal due to inability to achieve target heart rate. At achieved exercise load there is image artifact, without diagnostic evidence for perfusion abnormality.     Echo 3/26/2024:    Left Ventricle: Left ventricular cavity size is normal. Wall thickness is normal. The left ventricular ejection fraction is 55%. Systolic function is normal. Wall motion is normal. Diastolic function is normal.    Aorta: The aortic root is mildly dilated. The aortic root is 3.50 cm.     Echo 4/27/2023:    Left Ventricle: Left ventricular cavity size is normal. Wall thickness is increased. There is mild concentric hypertrophy. The left ventricular ejection fraction is 55%. Systolic function is normal. Wall motion is normal. Diastolic function is mildly abnormal, consistent with grade I (abnormal) relaxation.  Left atrial filling pressure is normal.    Left Atrium: The atrium is mildly dilated.    Aorta: The aortic root is mildly dilated. The ascending aorta is normal in size. The  "aortic root is 3.80 cm. The ascending aorta is 3.4 cm.    Mitral Valve: Mitral valve structure is normal. There is trace regurgitation. There is no evidence of stenosis.    Tricuspid Valve: Tricuspid valve structure is normal. There is trace regurgitation. There is no evidence of stenosis. Right ventricular systolic pressure could not be assessed.    Review of Systems   Constitutional: Negative.   HENT: Negative.     Cardiovascular:  Negative for chest pain, dyspnea on exertion, irregular heartbeat, leg swelling, near-syncope, orthopnea and palpitations.   Respiratory:  Negative for cough and snoring.    Endocrine: Negative.    Skin: Negative.    Musculoskeletal: Negative.    Gastrointestinal: Negative.    Genitourinary: Negative.    Neurological: Negative.    Psychiatric/Behavioral: Negative.         Vitals:    08/28/24 1546   BP: 130/86   Pulse:    SpO2:      Vitals:    08/28/24 1533   Weight: 80.4 kg (177 lb 3.2 oz)     Height: 5' 7\" (170.2 cm)   Body mass index is 27.75 kg/m².    Physical Exam  Vitals and nursing note reviewed.   Constitutional:       General: He is not in acute distress.     Appearance: He is well-developed. He is not diaphoretic.   HENT:      Head: Normocephalic and atraumatic.   Neck:      Vascular: No carotid bruit or JVD.   Cardiovascular:      Rate and Rhythm: Normal rate and regular rhythm.      Pulses: Intact distal pulses.      Heart sounds: Normal heart sounds, S1 normal and S2 normal. No murmur heard.     No friction rub. No gallop.   Pulmonary:      Effort: Pulmonary effort is normal. No respiratory distress.      Breath sounds: Normal breath sounds.   Abdominal:      General: There is no distension.      Palpations: Abdomen is soft.      Tenderness: There is no abdominal tenderness.   Skin:     General: Skin is warm and dry.      Findings: No rash.   Neurological:      Mental Status: He is alert and oriented to person, place, and time.   Psychiatric:         Mood and Affect: Mood " and affect normal.         Behavior: Behavior normal.

## 2024-08-28 ENCOUNTER — OFFICE VISIT (OUTPATIENT)
Dept: CARDIOLOGY CLINIC | Facility: CLINIC | Age: 54
End: 2024-08-28
Payer: COMMERCIAL

## 2024-08-28 VITALS
WEIGHT: 177.2 LBS | BODY MASS INDEX: 27.81 KG/M2 | HEIGHT: 67 IN | OXYGEN SATURATION: 98 % | SYSTOLIC BLOOD PRESSURE: 130 MMHG | DIASTOLIC BLOOD PRESSURE: 86 MMHG | HEART RATE: 87 BPM

## 2024-08-28 DIAGNOSIS — I10 PRIMARY HYPERTENSION: Primary | ICD-10-CM

## 2024-08-28 DIAGNOSIS — R55 NEAR SYNCOPE: ICD-10-CM

## 2024-08-28 DIAGNOSIS — F19.10 SUBSTANCE ABUSE (HCC): ICD-10-CM

## 2024-08-28 PROCEDURE — 99214 OFFICE O/P EST MOD 30 MIN: CPT | Performed by: NURSE PRACTITIONER

## 2024-08-28 NOTE — PATIENT INSTRUCTIONS
Continue your current medications.  Please complete the blood work.   Keep working on quitting smoking  Contact us with any concerns

## 2024-08-28 NOTE — ASSESSMENT & PLAN NOTE
BP is much improved and now at goal.  Accuracy of home BP cuff confirmed at previous OV.  Continue carvedilol 6.25 mg BID, losartan 100 mg daily, HCTZ 25 mg daily.  CMP now.  Reviewed importance of tobacco cessation.

## 2024-10-07 ENCOUNTER — HOSPITAL ENCOUNTER (EMERGENCY)
Facility: HOSPITAL | Age: 54
Discharge: HOME | End: 2024-10-07
Attending: EMERGENCY MEDICINE
Payer: COMMERCIAL

## 2024-10-07 VITALS
SYSTOLIC BLOOD PRESSURE: 187 MMHG | RESPIRATION RATE: 18 BRPM | HEART RATE: 95 BPM | TEMPERATURE: 98.2 F | DIASTOLIC BLOOD PRESSURE: 108 MMHG | OXYGEN SATURATION: 98 %

## 2024-10-07 DIAGNOSIS — M25.561 ACUTE PAIN OF RIGHT KNEE: Primary | ICD-10-CM

## 2024-10-07 DIAGNOSIS — M25.461 KNEE EFFUSION, RIGHT: ICD-10-CM

## 2024-10-07 PROCEDURE — 99283 EMERGENCY DEPT VISIT LOW MDM: CPT

## 2024-10-07 PROCEDURE — 99284 EMERGENCY DEPT VISIT MOD MDM: CPT | Performed by: EMERGENCY MEDICINE

## 2024-10-07 PROCEDURE — 96372 THER/PROPH/DIAG INJ SC/IM: CPT

## 2024-10-07 RX ORDER — DEXAMETHASONE SODIUM PHOSPHATE 10 MG/ML
8 INJECTION, SOLUTION INTRAMUSCULAR; INTRAVENOUS ONCE
Status: COMPLETED | OUTPATIENT
Start: 2024-10-07 | End: 2024-10-07

## 2024-10-07 RX ORDER — KETOROLAC TROMETHAMINE 30 MG/ML
30 INJECTION, SOLUTION INTRAMUSCULAR; INTRAVENOUS ONCE
Status: COMPLETED | OUTPATIENT
Start: 2024-10-07 | End: 2024-10-07

## 2024-10-07 RX ORDER — IBUPROFEN 600 MG/1
600 TABLET, FILM COATED ORAL EVERY 6 HOURS PRN
Qty: 30 TABLET | Refills: 0 | Status: SHIPPED | OUTPATIENT
Start: 2024-10-07

## 2024-10-07 RX ADMIN — KETOROLAC TROMETHAMINE 30 MG: 30 INJECTION, SOLUTION INTRAMUSCULAR at 02:16

## 2024-10-07 RX ADMIN — DEXAMETHASONE SODIUM PHOSPHATE 8 MG: 10 INJECTION, SOLUTION INTRAMUSCULAR; INTRAVENOUS at 02:15

## 2024-10-07 NOTE — ED PROVIDER NOTES
Final diagnoses:   Acute pain of right knee   Knee effusion, right     ED Disposition       ED Disposition   Discharge    Condition   Stable    Date/Time   Mon Oct 7, 2024  2:13 AM    Comment   Adan Read discharge to home/self care.                   Assessment & Plan       Medical Decision Making  Patient is afebrile nontoxic well-appearing clinically and hemodynamically stable in the emergency department no clinical or physical exam evidence of septic arthritis there is no erythema or warmth about the knee there is mild swelling of the right knee neurovascular intact distal.  No trauma or injury occurred for now we will recommend single dose of intramuscular steroid and anti-inflammatories and supportive care and follow-up with orthopedics for further evaluation and treatment return precautions and anticipatory guidance discussed.      Problems Addressed:  Acute pain of right knee: acute illness or injury  Knee effusion, right: acute illness or injury    Risk  Prescription drug management.             Medications   dexamethasone (PF) (DECADRON) injection 8 mg (8 mg Intramuscular Given 10/7/24 0215)   ketorolac (TORADOL) injection 30 mg (30 mg Intramuscular Given 10/7/24 0216)       ED Risk Strat Scores                                               History of Present Illness       Chief Complaint   Patient presents with    Knee Swelling     C/o right knee swelling and pain x2 days. No known injury       Past Medical History:   Diagnosis Date    Hypertension       History reviewed. No pertinent surgical history.   Family History   Problem Relation Age of Onset    Hypertension Mother     Hypertension Father     Hypertension Sister     Hypertension Brother       Social History     Tobacco Use    Smoking status: Every Day     Current packs/day: 1.00     Types: Cigarettes    Smokeless tobacco: Never   Vaping Use    Vaping status: Never Used   Substance Use Topics    Alcohol use: Not Currently     Comment: 1-2 times  per year    Drug use: Yes     Types: Marijuana      E-Cigarette/Vaping    E-Cigarette Use Never User       E-Cigarette/Vaping Substances      I have reviewed and agree with the history as documented.     Patient is a 54-year-old male no significant past medical or surgical history presents to the emergency department complaining of right knee pain and swelling started about 2 days ago no trauma or injury occurred to the knee no redness or warmth in the knee no fevers or chills.  Reports right knee feels like it is swollen.        History provided by:  Patient      Review of Systems   Musculoskeletal:  Positive for arthralgias and joint swelling.        Right knee pain and swelling   All other systems reviewed and are negative.          Objective       ED Triage Vitals [10/07/24 0206]   Temperature Pulse Blood Pressure Respirations SpO2 Patient Position - Orthostatic VS   98.2 °F (36.8 °C) 95 (!) 187/108 18 98 % Sitting      Temp Source Heart Rate Source BP Location FiO2 (%) Pain Score    Temporal -- Left arm -- 8      Vitals      Date and Time Temp Pulse SpO2 Resp BP Pain Score FACES Pain Rating User   10/07/24 0216 -- -- -- -- -- 8 -- JM   10/07/24 0206 98.2 °F (36.8 °C) 95 98 % 18 187/108 8 -- AH            Physical Exam  Vitals and nursing note reviewed.   Constitutional:       General: He is not in acute distress.     Appearance: Normal appearance.   HENT:      Head: Normocephalic and atraumatic.      Nose: Nose normal.   Eyes:      Conjunctiva/sclera: Conjunctivae normal.   Pulmonary:      Effort: Pulmonary effort is normal. No respiratory distress.   Musculoskeletal:      Right knee: Swelling and effusion present. No deformity, erythema or ecchymosis. Normal range of motion. Tenderness present.      Comments: Mild right knee swelling neurovascular intact distal no erythema or warmth.     Skin:     General: Skin is dry.      Findings: No bruising or erythema.   Neurological:      General: No focal deficit  present.      Mental Status: He is alert and oriented to person, place, and time.         Results Reviewed       None            No orders to display       Procedures    ED Medication and Procedure Management   Prior to Admission Medications   Prescriptions Last Dose Informant Patient Reported? Taking?   aspirin (ECOTRIN LOW STRENGTH) 81 mg EC tablet   No No   Sig: Take 1 tablet (81 mg total) by mouth daily Do not start before April 29, 2023.   carvedilol (COREG) 6.25 mg tablet   No No   Sig: Take 1 tablet (6.25 mg total) by mouth 2 (two) times a day with meals   hydroCHLOROthiazide 25 mg tablet   Yes No   Sig: Take 25 mg by mouth daily   losartan (COZAAR) 100 MG tablet   Yes No   Sig: Take 100 mg by mouth daily      Facility-Administered Medications: None     Patient's Medications   Discharge Prescriptions    IBUPROFEN (MOTRIN) 600 MG TABLET    Take 1 tablet (600 mg total) by mouth every 6 (six) hours as needed for moderate pain       Start Date: 10/7/2024 End Date: --       Order Dose: 600 mg       Quantity: 30 tablet    Refills: 0       ED SEPSIS DOCUMENTATION   Time reflects when diagnosis was documented in both MDM as applicable and the Disposition within this note       Time User Action Codes Description Comment    10/7/2024  2:13 AM Doc Farias Add [M25.561] Acute pain of right knee     10/7/2024  2:13 AM Doc Farias [M25.461] Knee effusion, right                  Doc Farias DO  10/07/24 0218

## 2024-10-18 ENCOUNTER — HOSPITAL ENCOUNTER (OUTPATIENT)
Dept: RADIOLOGY | Facility: CLINIC | Age: 54
End: 2024-10-18
Payer: COMMERCIAL

## 2024-10-18 VITALS
HEIGHT: 67 IN | DIASTOLIC BLOOD PRESSURE: 90 MMHG | SYSTOLIC BLOOD PRESSURE: 150 MMHG | TEMPERATURE: 97.8 F | OXYGEN SATURATION: 99 % | BODY MASS INDEX: 28.44 KG/M2 | WEIGHT: 181.2 LBS | HEART RATE: 87 BPM

## 2024-10-18 DIAGNOSIS — M25.561 ACUTE PAIN OF RIGHT KNEE: ICD-10-CM

## 2024-10-18 DIAGNOSIS — M25.461 KNEE EFFUSION, RIGHT: ICD-10-CM

## 2024-10-18 DIAGNOSIS — M17.11 PRIMARY OSTEOARTHRITIS OF RIGHT KNEE: Primary | ICD-10-CM

## 2024-10-18 PROCEDURE — 20610 DRAIN/INJ JOINT/BURSA W/O US: CPT | Performed by: ORTHOPAEDIC SURGERY

## 2024-10-18 PROCEDURE — 99204 OFFICE O/P NEW MOD 45 MIN: CPT | Performed by: ORTHOPAEDIC SURGERY

## 2024-10-18 PROCEDURE — 73564 X-RAY EXAM KNEE 4 OR MORE: CPT

## 2024-10-18 RX ORDER — TRIAMCINOLONE ACETONIDE 40 MG/ML
40 INJECTION, SUSPENSION INTRA-ARTICULAR; INTRAMUSCULAR
Status: COMPLETED | OUTPATIENT
Start: 2024-10-18 | End: 2024-10-18

## 2024-10-18 RX ORDER — BUPIVACAINE HYDROCHLORIDE 2.5 MG/ML
4 INJECTION, SOLUTION INFILTRATION; PERINEURAL
Status: COMPLETED | OUTPATIENT
Start: 2024-10-18 | End: 2024-10-18

## 2024-10-18 RX ADMIN — TRIAMCINOLONE ACETONIDE 40 MG: 40 INJECTION, SUSPENSION INTRA-ARTICULAR; INTRAMUSCULAR at 10:00

## 2024-10-18 RX ADMIN — BUPIVACAINE HYDROCHLORIDE 4 ML: 2.5 INJECTION, SOLUTION INFILTRATION; PERINEURAL at 10:00

## 2024-10-18 NOTE — PROGRESS NOTES
ASSESSMENT/PLAN:    Diagnoses and all orders for this visit:    Primary osteoarthritis of right knee    Acute pain of right knee  -     Ambulatory Referral to Orthopedic Surgery  -     XR knee 4+ vw right injury; Future    Knee effusion, right  -     Ambulatory Referral to Orthopedic Surgery        Plan: Treatment was discussed.  He did not want to pursue aspiration of his knee today.  He was willing to proceed with injection of local anesthetic and corticosteroid.  This was performed without difficulty and tolerated well.  Follow-up was discussed and he would prefer to return only if symptoms do not improve.  I have encouraged him to contact me if he wishes further reevaluation.    Return if symptoms worsen or fail to improve.      _____________________________________________________  CHIEF COMPLAINT:  Chief Complaint   Patient presents with    Right Knee - Pain         SUBJECTIVE:  Adan Read is a 54 y.o. year old male who presents for evaluation of right knee pain and swelling.  He noted onset about 2 weeks ago and denies any injury or change in his activity level.  He has had previous episodes of swelling in this knee but they generally subside within a short period of time and this episode has been more persistent.  He denies any change in the swelling over the past 2 weeks.  He notes start up pain and stiffness in his knee and pain with increasing activity.  He was seen in the emergency room at [facility], placed on ibuprofen but was unable to tolerate it secondary to GI symptoms.  He states that no x-rays or diagnostic imaging was performed.  He was recommended to seek orthopedic follow-up.  He does complain of pain along the medial aspect of the knee.  He denies instability.  He denies left knee complaints of significance.    PAST MEDICAL HISTORY:  Past Medical History:   Diagnosis Date    Hypertension        PAST SURGICAL HISTORY:  History reviewed. No pertinent surgical history.    FAMILY  "HISTORY:  Family History   Problem Relation Age of Onset    Hypertension Mother     Hypertension Father     Hypertension Sister     Hypertension Brother        SOCIAL HISTORY:  Social History     Tobacco Use    Smoking status: Every Day     Current packs/day: 1.00     Types: Cigarettes    Smokeless tobacco: Never   Vaping Use    Vaping status: Never Used   Substance Use Topics    Alcohol use: Not Currently     Comment: 1-2 times per year    Drug use: Yes     Types: Marijuana       MEDICATIONS:    Current Outpatient Medications:     aspirin (ECOTRIN LOW STRENGTH) 81 mg EC tablet, Take 1 tablet (81 mg total) by mouth daily Do not start before April 29, 2023., Disp: 30 tablet, Rfl: 0    carvedilol (COREG) 6.25 mg tablet, Take 1 tablet (6.25 mg total) by mouth 2 (two) times a day with meals, Disp: 60 tablet, Rfl: 0    hydroCHLOROthiazide 25 mg tablet, Take 25 mg by mouth daily, Disp: , Rfl:     losartan (COZAAR) 100 MG tablet, Take 100 mg by mouth daily, Disp: , Rfl:     ibuprofen (MOTRIN) 600 mg tablet, Take 1 tablet (600 mg total) by mouth every 6 (six) hours as needed for moderate pain (Patient not taking: Reported on 10/18/2024), Disp: 30 tablet, Rfl: 0    ALLERGIES:  No Known Allergies    Review of systems:   Constitutional: Negative for fatigue, fever or loss of apetite.   HENT: Negative.    Respiratory: Negative for shortness of breath, dyspnea.    Cardiovascular: Negative for chest pain/tightness.   Gastrointestinal: Negative for abdominal pain, N/V.   Endocrine: Negative for cold/heat intolerance, unexplained weight loss/gain.   Genitourinary: Negative for flank pain, dysuria, hematuria.   Musculoskeletal: Positive as in the HPI  Skin: Negative for rash.    Neurological: Negative  Psychiatric/Behavioral: Negative for agitation.  _____________________________________________________  PHYSICAL EXAMINATION:    Blood pressure 150/90, pulse 87, temperature 97.8 °F (36.6 °C), temperature source Temporal, height 5' 7\" " (1.702 m), weight 82.2 kg (181 lb 3.2 oz), SpO2 99%.    General: well developed and well nourished, alert, oriented times 3, and appears comfortable  Psychiatric: Normal  HEENT: Benign  Cardiovascular: Regular    Pulmonary: No wheezing or stridor  Abdomen: Soft, Nontender  Skin: No masses, erythema, lacerations, fluctation, ulcerations  Neurovascular: Motor and sensory exams are intact and pulses palpable.    MUSCULOSKELETAL EXAMINATION:    Right knee exam demonstrates a moderate effusion.  The skin is warm and dry.  He has full extension and flexion to about 100 degrees with complaints of mild pain and tightness with flexion.  He has tenderness over the medial femoral condyle and medial joint line.  The tibial plateau was nontender.  The anterior and lateral knee was nontender.  There is varus deformity at rest although this cannot be corrected with valgus stress either in full extension or 30 degrees of flexion.  Cruciate ligaments are stable.  The remainder of the lower extremity examination bilaterally is benign.      _____________________________________________________  STUDIES REVIEWED:  X-rays of his knee demonstrated narrowing of the medial joint space as well as small osteophytes at the intercondylar notch and the superior patella.  On the flexion image there is nearly complete loss of medial joint space.    PROCEDURES:  Large joint arthrocentesis: R knee  Universal Protocol:  Consent: Verbal consent obtained.  Consent given by: patient  Patient understanding: patient states understanding of the procedure being performed  Supporting Documentation  Indications: pain and joint swelling   Procedure Details  Location: knee - R knee  Needle size: 22 G  Ultrasound guidance: no  Approach: lateral  Medications administered: 4 mL bupivacaine 0.25 %; 40 mg triamcinolone acetonide 40 mg/mL              Esteban Delgado DO